# Patient Record
Sex: MALE | Race: WHITE | Employment: FULL TIME | URBAN - METROPOLITAN AREA
[De-identification: names, ages, dates, MRNs, and addresses within clinical notes are randomized per-mention and may not be internally consistent; named-entity substitution may affect disease eponyms.]

---

## 2017-06-06 ENCOUNTER — ALLSCRIPTS OFFICE VISIT (OUTPATIENT)
Dept: OTHER | Facility: OTHER | Age: 47
End: 2017-06-06

## 2017-12-12 ENCOUNTER — ALLSCRIPTS OFFICE VISIT (OUTPATIENT)
Dept: OTHER | Facility: OTHER | Age: 47
End: 2017-12-12

## 2017-12-12 DIAGNOSIS — E55.9 VITAMIN D DEFICIENCY: ICD-10-CM

## 2017-12-12 DIAGNOSIS — E78.5 HYPERLIPIDEMIA: ICD-10-CM

## 2017-12-12 DIAGNOSIS — I10 ESSENTIAL (PRIMARY) HYPERTENSION: ICD-10-CM

## 2017-12-13 NOTE — PROGRESS NOTES
Assessment    1  Hypertension (401 9) (I10)   2  Hyperlipidemia (272 4) (E78 5)   3  Current smoker (305 1) (F17 200)   · currently 2 packs per day  started in the 1990s, usually about 1ppd    Plan  Hyperlipidemia    · Atorvastatin Calcium 20 MG Oral Tablet; take one tablet by mouth every day  Hyperlipidemia, Hypertension    · (1) COMPREHENSIVE METABOLIC PANEL; Status:Active; Requested for:17Jpi5371;    · (1) LIPID PANEL FASTING W DIRECT LDL REFLEX; Status:Active; Requested for:50Etb0813;    · (1) TSH WITH FT4 REFLEX; Status:Active; Requested for:15Jeo7451;   Vitamin D deficiency    · (1) VITAMIN D 25-HYDROXY; Status:Active; Requested for:36Ugr5818;     Discussion/Summary  Discussion Summary:   1  recommend flu vaccinefasting blood workcontinue current blood pressure medication  Counseling Documentation With Imm: The patient was counseled regarding instructions for management,-- patient and family education,-- impressions,-- risks and benefits of treatment options,-- importance of compliance with treatment  Medication SE Review and Pt Understands Tx: Possible side effects of new medications were reviewed with the patient/guardian today  The treatment plan was reviewed with the patient/guardian  The patient/guardian understands and agrees with the treatment plan      Chief Complaint  Chief Complaint Chronic Condition [facility]: Patient is here today for follow up of chronic conditions described in HPI        History of Present Illness  HPI: 57 y/o M here for follow up  above - reviewed meds with patientout of bp medication - took lower dose of same (5/40mg) over last 2 daysnot return for bp re-check after last OVnot check BP at home  smoker, not ready to quithe had to do a urinalysis with his BW Last yearflu vaccine  4:56pm, pt refused to have bp re-checked, stating he has been here too long and has to E  I  vlad Christie that we are looking out for his health and a re-check of his blood pressure would only take 2-3 more mins  stated he will check his BP at home and call with office with readings and proceed to walk out of officeother complaints      Review of Systems  Complete-Male:  Constitutional: no fever  Eyes: no eyesight problems  ENT: no sore throat  Cardiovascular: no chest pain  Respiratory: no shortness of breath  Gastrointestinal: no abdominal pain  Genitourinary: no dysuria  Psychiatric: no depression  ROS Reviewed:   ROS reviewed  Active Problems  1  Abnormal finding on imaging (793 99) (R93 8)   2  Aftercare following surgery of the musculoskeletal system (V58 78) (Z47 89)   3  Anxiety (300 00) (F41 9)   4  Backache (724 5) (M54 9)   5  Changing skin lesion (709 9) (L98 9)   6  Encounter for smoking cessation counseling (V65 42,305 1) (Z71 6,Z72 0)   7  Encounter to discuss test results (V65 49) (Z71 89)   8  Gout (274 9) (M10 9)   9  Hyperlipidemia (272 4) (E78 5)   10  Hypertension (401 9) (I10)   11  Insomnia (780 52) (G47 00)   12  History of Neck pain (723 1) (M54 2)   13  Need for prophylactic vaccination and inoculation against influenza (V04 81) (Z23)   14  Palpitations (785 1) (R00 2)   15  Screening for prostate cancer (V76 44) (Z12 5)   16  Screening for thyroid disorder (V77 0) (Z13 29)   17  Vitamin D deficiency (268 9) (E55 9)   18  Well adult on routine health check (V70 0) (Z00 00)    Past Medical History  1  History of diverticulitis of colon (V12 79) (Z87 19)   2  History of urinary frequency (V13 09) (Z87 898)   3  History of Neck pain (723 1) (M54 2)   4  Palpitations (785 1) (R00 2)  Active Problems And Past Medical History Reviewed: The active problems and past medical history were reviewed and updated today  Surgical History  1  History of Partial Colectomy    Family History  Mother    1  Family history of Chronic Obstructive Pulmonary Disease   2  Family history of Hyperlipidemia   3  Family history of Hypertension (V17 49)  Father    4   Family history of Chronic Obstructive Pulmonary Disease  Maternal Grandfather    5  Family history of Heart Disease (V17 49)  Family History    6  Denied: Family history of Cancer   7  Denied: Family history of Diabetes Mellitus   8  Denied: Family history of Stroke Syndrome    Social History     · Being A Social Drinker   · Current smoker (305 1) (F17 200)   · Daily Coffee Consumption (6  Cups/Day)   · Work History  Social History Reviewed: The social history was reviewed and updated today  Current Meds   1  Amlodipine Besy-Benazepril HCl - 10-20 MG Oral Capsule; TAKE ONE CAPSULE BY MOUTH EVERY DAY; Therapy: 87NRB5116 to ([place])  Requested for: 55YLB9497; Last Rx:12Xwf1611 Ordered   2  Atorvastatin Calcium 20 MG Oral Tablet; take one tablet by mouth every day; Therapy: 60CRA7098 to (Evaluate:72Imk6412)  Requested for: 24FFK9708; Last Rx:00Hzh6049 Ordered  Medication List Reviewed: The medication list was reviewed and updated today  Allergies  1  No Known Drug Allergies    Vitals  Vital Signs    Recorded: 97Dhk9376 04:50PM Recorded: 86Lpj0491 04:26PM   Temperature  97 3 F   Heart Rate  100   Respiration  18   Systolic 958 008   Diastolic 88 90   Height  6 ft 1 in   Weight  256 lb    BMI Calculated  33 78   BSA Calculated  2 39   O2 Saturation  97       Physical Exam   Constitutional  General appearance: No acute distress, well appearing and well nourished  Eyes  Pupils and irises: Equal, round, reactive to light  Ears, Nose, Mouth, and Throat  Oropharynx: Normal with no erythema, edema, exudate or lesions  Pulmonary  Respiratory effort: No increased work of breathing or signs of respiratory distress  Auscultation of lungs: Clear to auscultation  Cardiovascular  Auscultation of heart: Normal rate and rhythm, normal S1 and S2, no murmurs  Examination of extremities for edema and/or varicosities: Normal    Abdomen  Abdomen: Non-tender, no masses     Psychiatric  Judgment and insight: Normal    Mood and affect: Normal   Mood and Affect: anxious-- and-- irritable        Results/Data  PHQ-2 Adult Depression Screening 21Dtw3579 04:29PM      Test Name Result Flag Reference   PHQ-2 Adult Depression Score 0     PHQ-2 Adult Depression Screening Negative           Provider Comments  Provider Comments:   deandre Quijano about SE of uncontrolled HTN including heart/kidney disease  not ready to quit smoking      Signatures   Electronically signed by : Mary Lou Carrasco DO; Dec 12 2017  5:08PM EST                       (Author)

## 2018-01-11 NOTE — RESULT NOTES
Message   blood test results are back   kidney/liver function, urine test & blood sugar look fine  prostate blood test (PSA) is normal at 2 (normal range 0 to 4)  cholesterol is controlled at 172 but bit higher than last year's results  vitamin D is improved but mildly low at 28 (normal is )  recommend to increase vitamin D3 over the counter to 2,000 IU daily for next 3-4 months  Otherwise continue with current treatment plan  Verified Results  (1) VITAMIN D 25-HYDROXY [id number] 08:36AM Janiya Rachel     Test Name Result Flag Reference   Vitamin D, 25-Hydroxy 28 7 ng/mL L 30 0-100 0   Vitamin D deficiency has been defined by the [address] practice guideline as a  level of serum 25-OH vitamin D less than 20 ng/mL (1,2)  The Endocrine Society went on to further define vitamin D  insufficiency as a level between 21 and 29 ng/mL (2)  1  IOM ([place] [employer])  2010  Dietary reference     intakes for calcium and D  [facility]: The     [employer]  2  Zachary MF, Ki SHAW, Sada CRAWFORD, et al      Evaluation, treatment, and prevention of vitamin D     deficiency: an Endocrine Society clinical practice     guideline  JCEM  2011 Jul; 96(7):1911-30  [facility] ZJE15 Default [id number] 08:36AM Janiya Rachel     Test Name Result Flag Reference   Cy Pyo CMP14 Default Comment     A hand-written panel/profile was received from your office  In  accordance with the [facility] Ambiguous Test Code Policy dated July 4169, we have completed your order by using the closest currently  or formerly recognized AMA panel  We have assigned Comprehensive  Metabolic Panel (14), Test Code #094081 to this request   If this  is not the testing you wished to receive on this specimen, please  contact the [address] [place] [address] Client Inquiry/Technical Services Department  to clarify the test order  We appreciate your business       (HUNTER) Orville Osman LP Default [id number] 08:36AM Jessica Lowe     Test Name Result Flag Reference   Orville Osman LP Default Comment     A hand-written panel/profile was received from your office  In  accordance with the [facility] Ambiguous Test Code Policy dated July 6991, we have completed your order by using the closest currently  or formerly recognized AMA panel  We have assigned Lipid Panel,  Test Code #064347 to this request  If this is not the testing you  wished to receive on this specimen, please contact the [address] [place] [address]  Client Inquiry/Technical Services Department to clarify the test  order  We appreciate your business       (1) COMPREHENSIVE METABOLIC PANEL [id number] 78:34CP Jessica Lowe     Test Name Result Flag Reference   Glucose, Serum 91 mg/dL  65-99   BUN 12 mg/dL  6-24   Creatinine, Serum 0 82 mg/dL  0 76-1 27   eGFR If NonAfricn Am 107 mL/min/1 73  >59   eGFR If Africn Am 123 mL/min/1 73  >59   BUN/Creatinine Ratio 15  9-20   Sodium, Serum 138 mmol/L  136-144   **Effective December 12, 2016 the reference interval**                   for Sodium, Serum will be changing to:                                                             134 - 144   Potassium, Serum 4 5 mmol/L  3 5-5 2   Chloride, Serum 97 mmol/L     **Effective December 12, 2016 the reference interval**                   for Chloride, Serum will be changing to:                                                              96 - 106   Carbon Dioxide, Total 24 mmol/L  18-29   Calcium, Serum 9 3 mg/dL  8 7-10 2   Protein, Total, Serum 6 5 g/dL  6 0-8 5   Albumin, Serum 4 6 g/dL  3 5-5 5   Globulin, Total 1 9 g/dL  1 5-4 5   A/G Ratio 2 4  1 1-2 5   Bilirubin, Total 0 4 mg/dL  0 0-1 2   Alkaline Phosphatase, S 79 IU/L     AST (SGOT) 23 IU/L  0-40   ALT (SGPT) 38 IU/L  0-44     (LC) UA/M w/rflx Culture, Routine [id number] 08:36AM Jessica Lowe     Test Name Result Flag Reference   Specific Gravity 1 007  1 005-1 030   pH 7 5  5 0-7 5   Urine-Color Yellow  Yellow Appearance Clear  Clear   WBC Esterase Negative  Negative   Protein Negative  Negative/Trace   Glucose Negative  Negative   Ketones Negative  Negative   Occult Blood Negative  Negative   Bilirubin Negative  Negative   Urobilinogen,Semi-Qn 0 2 EU/dL  0 2-1 0   Nitrite, Urine Negative  Negative   Microscopic Examination Comment     Microscopic follows if indicated  Microscopic Examination See below:     Urinalysis Reflex Comment     This specimen will not reflex to a Urine Culture  WBC 0-5 /hpf  0 -  5   RBC None seen /hpf  0 -  2   Epithelial Cells (non renal) None seen /hpf  0 - 10   Bacteria None seen  None seen/Few     (LC) Lipid Panel [id number] 08:36AM Alvia [place]     Test Name Result Flag Reference   Cholesterol, Total 172 mg/dL  100-199   Triglycerides 96 mg/dL  0-149   HDL Cholesterol 45 mg/dL  >39   VLDL Cholesterol Mario 19 mg/dL  5-40   LDL Cholesterol Calc 108 mg/dL H 0-99     (1) PSA (SCREEN) (Dx V76 44 Screen for Prostate Cancer) [id number] 08:36AM Alvia [place]     Test Name Result Flag Reference   Prostate Specific Ag, Serum 2 0 ng/mL  0 0-4 0   Roche ECLIA methodology  According to the American Urological Association, Serum PSA should  decrease and remain at undetectable levels after radical  prostatectomy  The AUA defines biochemical recurrence as an initial  PSA value 0 2 ng/mL or greater followed by a subsequent confirmatory  PSA value 0 2 ng/mL or greater  Values obtained with different assay methods or kits cannot be used  interchangeably  Results cannot be interpreted as absolute evidence  of the presence or absence of malignant disease

## 2018-01-13 VITALS
SYSTOLIC BLOOD PRESSURE: 154 MMHG | BODY MASS INDEX: 32.64 KG/M2 | RESPIRATION RATE: 18 BRPM | OXYGEN SATURATION: 98 % | HEART RATE: 102 BPM | TEMPERATURE: 99 F | WEIGHT: 246.25 LBS | HEIGHT: 73 IN | DIASTOLIC BLOOD PRESSURE: 92 MMHG

## 2018-01-13 NOTE — PROGRESS NOTES
Assessment    1  Aftercare following surgery of the musculoskeletal system (V58 78) (Z47 89)    Discussion/Summary    He is released from formal care  We will see him when necessary    He may return to work  Chief Complaint    1  Foot Problem  followup for left foot fractures      History of Present Illness  HPI: this gentleman is status post traumatic injury to his left foot, involving the first ray of the great toe  He had open fractures, operative intervention to provide closure, to the, skin, and to promote healing  He presents, the office today  Ambulatory without an assistive   he is able to stand, and weight-bear without pain  He has had reconstitution of some subtle motion, of the great toe  At the MTP joint      Active Problems    1  Abnormal finding on imaging (793 99) (R93 8)   2  Aftercare following surgery of the musculoskeletal system (V58 78) (Z47 89)   3  Anxiety (300 00) (F41 9)   4  Backache (724 5) (M54 9)   5  Encounter for smoking cessation counseling (V65 42,305 1) (Z71 6,Z72 0)   6  Encounter to discuss test results (V65 49) (Z71 89)   7  Gout (274 9) (M10 9)   8  Hyperlipidemia (272 4) (E78 5)   9  Hypertension (401 9) (I10)   10  Insomnia (780 52) (G47 00)   11  History of Neck pain (723 1) (M54 2)   12  Need for prophylactic vaccination and inoculation against influenza (V04 81) (Z23)   13  Palpitations (785 1) (R00 2)   14  Screening for prostate cancer (V76 44) (Z12 5)   15  Screening for thyroid disorder (V77 0) (Z13 29)   16  Vitamin D deficiency (268 9) (E55 9)   17   Well adult on routine health check (V70 0) (Z00 00)    Past Medical History    · History of diverticulitis of colon (V12 79) (Z87 19)   · History of urinary frequency (V13 09) (J76 528)   · History of Neck pain (723 1) (M54 2)   · Palpitations (785 1) (R00 2)    Surgical History    · History of Partial Colectomy    Family History    · Family history of Chronic Obstructive Pulmonary Disease   · Family history of Hyperlipidemia   · Family history of Hypertension (V17 49)    · Family history of Chronic Obstructive Pulmonary Disease    · Family history of Heart Disease (V17 49)    · Denied: Family history of Cancer   · Denied: Family history of Diabetes Mellitus   · Denied: Family history of Stroke Syndrome    Social History    · Being A Social Drinker   · Current smoker (305 1) (F17 200)   · currently 2 packs per day  started in the 1990s, usually about 1ppd   · Daily Coffee Consumption (6  Cups/Day)   · Work History   · , construction    Current Meds   1  Amlodipine Besy-Benazepril HCl - 5-20 MG Oral Capsule; TAKE 1 CAPSULE EVERY   DAY  Requested for: 42IIZ7766; Last Rx:03Nov2015 Ordered   2  Atorvastatin Calcium 20 MG Oral Tablet; TAKE 1 TABLET DAILY AT BEDTIME; Therapy: 91KBM6731 to (Shayan Krueger)  Requested for: 25KOD7835; Last   Rx:03Nov2015 Ordered   3  BuPROPion HCl ER (Smoking Det) 150 MG Oral Tablet Extended Release 12 Hour;   TAKE 1 TABLET ONCE A DAY FOR 3 DAYS THEN TAKE 1 TABLET TWICE A   DAY THEREAFTER; Therapy: 57VHV9070 to (Last Rx:18Hil0148)  Requested for: 82VCS8696 Ordered   4  OxyCODONE HCl - 5 MG Oral Tablet; 1-2 tabs by mouth every 4 hours when necessary   pain; Therapy: 15NYU0896 to (Evaluate:17Ovw2977); Last Rx:27Nov2015 Ordered   5  Vitamin D 1000 UNIT CAPS; take 1 capsule daily; Therapy: 42YSK3385 to (Evaluate:15Apr2015)  Requested for: 80SND3223; Last   Rx:61Xad5663 Ordered    Allergies    1  No Known Drug Allergies    Vitals   Recorded: 90LBV4561 01:32PM   Heart Rate 99   Systolic 431   Diastolic 96   Height 6 ft 1 in   Weight 239 lb 6 08 oz   BMI Calculated 31 58   BSA Calculated 2 32     Physical Exam   his left foot, and great toe, have resumed nearer normal   Size  He has significant healing  Color of his left foot, predominantly the medial left foot, at the MTP joints, is now, resolved    The first 2 nails great toe, and adjacent nail or darkened, and great toe, is being pushed or replaced by, a newgrowing toenail        Attending Note  Collaborating Physician Note: Collaborating Note: I interviewed and examined the patient, I supervised the Advanced Practitioner and I agree with the Advanced Practitioner note  I discussed the case with the Advanced Practitioner and reviewed the AP note      Message  Return to work or school:    He is able to return to work on  01/18/2016      No restrictions          Signatures   Electronically signed by : Alex Ramirez, [facility]; [place] 15 2016  1:59PM EST                       (Author)    Electronically signed by : RK Mccray ; [place] 15 2016  2:02PM EST                       (Author)

## 2018-01-16 NOTE — MISCELLANEOUS
Message  Return to work or school:    He is able to return to work on  01/18/2016      No restrictions          Signatures   Electronically signed by : Gigi Osorio [facility]; [place] 15 2016  1:59PM EST                       (Author)    Electronically signed by : RK Feldman ; [place] 15 2016  2:02PM EST                       (Author)

## 2018-01-23 VITALS
WEIGHT: 256 LBS | RESPIRATION RATE: 18 BRPM | OXYGEN SATURATION: 97 % | TEMPERATURE: 97.3 F | HEIGHT: 73 IN | BODY MASS INDEX: 33.93 KG/M2 | SYSTOLIC BLOOD PRESSURE: 140 MMHG | DIASTOLIC BLOOD PRESSURE: 88 MMHG | HEART RATE: 100 BPM

## 2018-02-23 ENCOUNTER — TELEPHONE (OUTPATIENT)
Dept: INTERNAL MEDICINE CLINIC | Facility: CLINIC | Age: 48
End: 2018-02-23

## 2018-02-23 DIAGNOSIS — E78.2 MIXED HYPERLIPIDEMIA: Primary | ICD-10-CM

## 2018-02-23 RX ORDER — ATORVASTATIN CALCIUM 20 MG/1
1 TABLET, FILM COATED ORAL DAILY
COMMUNITY
Start: 2014-10-17 | End: 2018-02-23 | Stop reason: SDUPTHER

## 2018-02-23 RX ORDER — AMLODIPINE BESYLATE AND BENAZEPRIL HYDROCHLORIDE 10; 20 MG/1; MG/1
1 CAPSULE ORAL DAILY
COMMUNITY
Start: 2017-06-06 | End: 2018-03-22 | Stop reason: SDUPTHER

## 2018-02-23 RX ORDER — ATORVASTATIN CALCIUM 20 MG/1
20 TABLET, FILM COATED ORAL DAILY
Qty: 30 TABLET | Refills: 5 | Status: SHIPPED | OUTPATIENT
Start: 2018-02-23 | End: 2018-06-28 | Stop reason: SDUPTHER

## 2018-02-23 NOTE — TELEPHONE ENCOUNTER
Pharmacy requesting refill on atorvastatin 20 mg tabs, qty 30, take 1 daily   To [facility] pharmacy on file

## 2018-03-22 DIAGNOSIS — I10 ESSENTIAL HYPERTENSION: Primary | ICD-10-CM

## 2018-03-22 RX ORDER — AMLODIPINE BESYLATE AND BENAZEPRIL HYDROCHLORIDE 10; 20 MG/1; MG/1
1 CAPSULE ORAL DAILY
Qty: 90 CAPSULE | Refills: 0 | Status: SHIPPED | OUTPATIENT
Start: 2018-03-22 | End: 2018-06-28 | Stop reason: SDUPTHER

## 2018-03-22 NOTE — TELEPHONE ENCOUNTER
Phone number on file is currently "not accepting calls at this time" no other # to try  Mail letter home?

## 2018-03-22 NOTE — TELEPHONE ENCOUNTER
Did pt complete BW after last OV with me?     If not, it has been >2 yrs since last BW & pt needs to complete as soon as possible    thx

## 2018-05-16 LAB
25(OH)D3+25(OH)D2 SERPL-MCNC: 32.1 NG/ML (ref 30–100)
ALBUMIN SERPL-MCNC: 4.9 G/DL (ref 3.5–5.5)
ALBUMIN/GLOB SERPL: 2.1 {RATIO} (ref 1.2–2.2)
ALP SERPL-CCNC: 88 IU/L (ref 39–117)
ALT SERPL-CCNC: 45 IU/L (ref 0–44)
AMBIG ABBREV DEFAULT: NORMAL
AST SERPL-CCNC: 33 IU/L (ref 0–40)
BILIRUB SERPL-MCNC: 0.4 MG/DL (ref 0–1.2)
BUN SERPL-MCNC: 11 MG/DL (ref 6–24)
BUN/CREAT SERPL: 14 (ref 9–20)
CALCIUM SERPL-MCNC: 9.6 MG/DL (ref 8.7–10.2)
CHLORIDE SERPL-SCNC: 96 MMOL/L (ref 96–106)
CHOLEST SERPL-MCNC: 157 MG/DL (ref 100–199)
CO2 SERPL-SCNC: 27 MMOL/L (ref 18–29)
CREAT SERPL-MCNC: 0.76 MG/DL (ref 0.76–1.27)
GLOBULIN SER-MCNC: 2.3 G/DL (ref 1.5–4.5)
GLUCOSE SERPL-MCNC: 91 MG/DL (ref 65–99)
HDLC SERPL-MCNC: 53 MG/DL
LDLC SERPL CALC-MCNC: 88 MG/DL (ref 0–99)
POTASSIUM SERPL-SCNC: 4.5 MMOL/L (ref 3.5–5.2)
PROT SERPL-MCNC: 7.2 G/DL (ref 6–8.5)
SL AMB EGFR AFRICAN AMERICAN: 126 ML/MIN/1.73
SL AMB EGFR NON AFRICAN AMERICAN: 109 ML/MIN/1.73
SODIUM SERPL-SCNC: 140 MMOL/L (ref 134–144)
TRIGL SERPL-MCNC: 78 MG/DL (ref 0–149)
TSH SERPL DL<=0.005 MIU/L-ACNC: 1.94 UIU/ML (ref 0.45–4.5)

## 2018-05-17 ENCOUNTER — TELEPHONE (OUTPATIENT)
Dept: INTERNAL MEDICINE CLINIC | Facility: CLINIC | Age: 48
End: 2018-05-17

## 2018-05-17 DIAGNOSIS — R74.01 ELEVATED ALT MEASUREMENT: Primary | ICD-10-CM

## 2018-05-17 NOTE — TELEPHONE ENCOUNTER
Tried calling pt and got message that the person you are trying to reach is not accepting calls at this time  Cb later   Will cb

## 2018-05-17 NOTE — TELEPHONE ENCOUNTER
BW is back & looks fine except for mild elevation in liver enzyme, need to re-check liver BW in 1 month and Manuel Garcia needs an appt in June    pls schedule, thx

## 2018-05-18 PROBLEM — R74.01 ELEVATED ALT MEASUREMENT: Status: ACTIVE | Noted: 2018-05-18

## 2018-05-18 NOTE — TELEPHONE ENCOUNTER
2nd attempt     Tried calling pt and got message that pt is not accepting calls     Do you want us to mail a letter home?

## 2018-06-28 DIAGNOSIS — E78.2 MIXED HYPERLIPIDEMIA: ICD-10-CM

## 2018-06-28 DIAGNOSIS — I10 ESSENTIAL HYPERTENSION: ICD-10-CM

## 2018-06-28 RX ORDER — AMLODIPINE BESYLATE AND BENAZEPRIL HYDROCHLORIDE 10; 20 MG/1; MG/1
CAPSULE ORAL
Qty: 90 CAPSULE | Refills: 0 | Status: SHIPPED | OUTPATIENT
Start: 2018-06-28 | End: 2018-09-25 | Stop reason: SDUPTHER

## 2018-06-28 RX ORDER — ATORVASTATIN CALCIUM 20 MG/1
20 TABLET, FILM COATED ORAL DAILY
Qty: 90 TABLET | Refills: 0 | Status: SHIPPED | OUTPATIENT
Start: 2018-06-28 | End: 2019-03-07 | Stop reason: SDUPTHER

## 2018-06-28 RX ORDER — AMLODIPINE BESYLATE AND BENAZEPRIL HYDROCHLORIDE 10; 20 MG/1; MG/1
1 CAPSULE ORAL DAILY
Qty: 90 CAPSULE | Refills: 0 | Status: CANCELLED | OUTPATIENT
Start: 2018-06-28

## 2018-07-25 ENCOUNTER — OFFICE VISIT (OUTPATIENT)
Dept: INTERNAL MEDICINE CLINIC | Facility: CLINIC | Age: 48
End: 2018-07-25
Payer: COMMERCIAL

## 2018-07-25 VITALS
SYSTOLIC BLOOD PRESSURE: 146 MMHG | HEIGHT: 74 IN | RESPIRATION RATE: 18 BRPM | TEMPERATURE: 99.2 F | OXYGEN SATURATION: 98 % | BODY MASS INDEX: 33.34 KG/M2 | DIASTOLIC BLOOD PRESSURE: 88 MMHG | HEART RATE: 96 BPM | WEIGHT: 259.8 LBS

## 2018-07-25 DIAGNOSIS — R74.01 ELEVATED ALT MEASUREMENT: ICD-10-CM

## 2018-07-25 DIAGNOSIS — E78.2 MIXED HYPERLIPIDEMIA: ICD-10-CM

## 2018-07-25 DIAGNOSIS — I10 ESSENTIAL HYPERTENSION: Primary | ICD-10-CM

## 2018-07-25 DIAGNOSIS — Z72.0 TOBACCO USE: ICD-10-CM

## 2018-07-25 PROCEDURE — 99213 OFFICE O/P EST LOW 20 MIN: CPT | Performed by: INTERNAL MEDICINE

## 2018-07-25 PROCEDURE — 3008F BODY MASS INDEX DOCD: CPT | Performed by: INTERNAL MEDICINE

## 2018-07-25 NOTE — PROGRESS NOTES
Assessment/Plan:     Diagnoses and all orders for this visit:    Essential hypertension  Comments:  with hx of white coat syndrome, BP on re-check by me still in 796B systolic   c/w lotrel and monitoring BP at home, if elevated advised pt to call office    Mixed hyperlipidemia  Comments:  Burke risk score of ~13 9%  Refuses to increase atorvastatin dose    Elevated ALT measurement  Comments:  pt interruptive but discussed lab results and to re-check BW for liver enzymes  Orders:  -     Hepatic function panel; Future    Tobacco use  Comments:  still smoking, not ready to attempt quitting      pt reports he does not want to complete f/u liver BW "I get one free lab test per year"  I advised him to complete f/u BW as mild elevation in liver enzyme could be from variety of causes but Swati Mendez interrupted me before further explanation could be provided(e g  alcohol, statins)    Subjective:      Patient ID: Ramy Potts is a 53 y o  male  HPI    Here for follow up  Reviewed meds & most recent BW results with patient  Doug العلي is still smoking and states he is ready to quit but can't because of stress  Advised we called him with his BW results 2+ mos ago and got VM & verified his phone # which is incorrect, "but your stupid fish(Ipad) has it right"  We mailed him a letter in May to follow up but he did not call office back  Declines to increase dose of atorvastatin as his friend was 'put on the same thing' and 'doesn't feel the same'  Understands risks of cont'd smoking including dying of tob related disease  Hx of white coat syndrome and medical non-compliance, needs to be called to complete BW and schedule f/u appts  Pt irritable and short during his appt  He reports his BP is 'better' at home with automated arm cuff - in 130/70s and not 140/90 or higher  No other complaints      Past Medical History:   Diagnosis Date    Diverticulitis of colon      Vitals:    07/25/18 1604   BP: 146/88   Pulse: 96 Resp: 18   Temp: 99 2 °F (37 3 °C)   TempSrc: Oral   SpO2: 98%   Weight: 118 kg (259 lb 12 8 oz)   Height: 6' 2" (1 88 m)     Body mass index is 33 36 kg/m²  Current Outpatient Prescriptions:     amLODIPine-benazepril (LOTREL) 10-20 MG per capsule, TAKE ONE CAPSULE BY MOUTH EVERY DAY, Disp: 90 capsule, Rfl: 0    atorvastatin (LIPITOR) 20 mg tablet, Take 1 tablet (20 mg total) by mouth daily, Disp: 90 tablet, Rfl: 0  No Known Allergies      Review of Systems   Constitutional: Negative for fever  HENT: Negative for congestion  Eyes: Negative for visual disturbance  Respiratory: Negative for shortness of breath  Cardiovascular: Negative for chest pain  Gastrointestinal: Negative for abdominal pain  Genitourinary: Negative for difficulty urinating  Musculoskeletal: Negative for arthralgias  Neurological: Negative for dizziness  Psychiatric/Behavioral: Positive for agitation  Objective:      /88   Pulse 96   Temp 99 2 °F (37 3 °C) (Oral)   Resp 18   Ht 6' 2" (1 88 m)   Wt 118 kg (259 lb 12 8 oz)   SpO2 98%   BMI 33 36 kg/m²          Physical Exam   Constitutional: He appears well-developed and well-nourished  HENT:   Head: Normocephalic and atraumatic  Mouth/Throat: Oropharynx is clear and moist    Eyes: Conjunctivae are normal  Pupils are equal, round, and reactive to light  Cardiovascular: Normal rate, regular rhythm and normal heart sounds  No murmur heard  Pulmonary/Chest: Effort normal and breath sounds normal  He has no wheezes  He has no rales  Abdominal: Bowel sounds are normal    Musculoskeletal: He exhibits no edema  Neurological: He is alert  Psychiatric: He has a normal mood and affect  His behavior is normal    Irritable/interruptive   Vitals reviewed        Results for orders placed or performed in visit on 05/15/18   Comprehensive metabolic panel   Result Value Ref Range    SL AMB GLUCOSE 91 65 - 99 mg/dL    BUN 11 6 - 24 mg/dL    Creatinine, Serum 0 76 0 76 - 1 27 mg/dL    eGFR Non African American 109 >59 mL/min/1 73    SL AMB EGFR AFRICAN AMERICAN 126 >59 mL/min/1 73    SL AMB BUN/CREATININE RATIO 14 9 - 20    SL AMB SODIUM 140 134 - 144 mmol/L    SL AMB POTASSIUM 4 5 3 5 - 5 2 mmol/L    SL AMB CHLORIDE 96 96 - 106 mmol/L    SL AMB CARBON DIOXIDE 27 18 - 29 mmol/L    CALCIUM 9 6 8 7 - 10 2 mg/dL    SL AMB PROTEIN, TOTAL 7 2 6 0 - 8 5 g/dL    Serum Albumin 4 9 3 5 - 5 5 g/dL    Globulin, Total 2 3 1 5 - 4 5 g/dL    SL AMB ALBUMIN/GLOBULIN RATIO 2 1 1 2 - 2 2    SL AMB BILIRUBIN, TOTAL 0 4 0 0 - 1 2 mg/dL    Alk Phos Isoenzymes 88 39 - 117 IU/L    SL AMB AST 33 0 - 40 IU/L    SL AMB ALT 45 (H) 0 - 44 IU/L   Lipid panel   Result Value Ref Range    Cholesterol, Total 157 100 - 199 mg/dL    Triglycerides 78 0 - 149 mg/dL    HDL 53 >39 mg/dL    LDL Direct 88 0 - 99 mg/dL   Vitamin D 25 hydroxy   Result Value Ref Range    25-HYDROXY VIT D 32 1 30 0 - 100 0 ng/mL   TSH WITH REFLEX TO FREE T4   Result Value Ref Range    TSH 1 940 0 450 - 4 500 uIU/mL   Ambig Abbrev Default   Result Value Ref Range    AMBIG ABBREV DEFAULT Comment

## 2018-08-25 DIAGNOSIS — E78.2 MIXED HYPERLIPIDEMIA: ICD-10-CM

## 2018-08-26 RX ORDER — ATORVASTATIN CALCIUM 20 MG/1
TABLET, FILM COATED ORAL
Qty: 30 TABLET | Refills: 5 | Status: SHIPPED | OUTPATIENT
Start: 2018-08-26 | End: 2019-03-07

## 2018-09-25 DIAGNOSIS — I10 ESSENTIAL HYPERTENSION: ICD-10-CM

## 2018-09-25 RX ORDER — AMLODIPINE BESYLATE AND BENAZEPRIL HYDROCHLORIDE 10; 20 MG/1; MG/1
CAPSULE ORAL
Qty: 90 CAPSULE | Refills: 0 | Status: SHIPPED | OUTPATIENT
Start: 2018-09-25 | End: 2018-12-26 | Stop reason: SDUPTHER

## 2018-12-26 DIAGNOSIS — I10 ESSENTIAL HYPERTENSION: ICD-10-CM

## 2018-12-26 RX ORDER — AMLODIPINE BESYLATE AND BENAZEPRIL HYDROCHLORIDE 10; 20 MG/1; MG/1
1 CAPSULE ORAL DAILY
Qty: 90 CAPSULE | Refills: 0 | Status: SHIPPED | OUTPATIENT
Start: 2018-12-26 | End: 2019-03-07 | Stop reason: SDUPTHER

## 2018-12-26 NOTE — TELEPHONE ENCOUNTER
Are Enrique's home blood pressure readings okay? What are his home readings? Is he going to complete Blood work as recommended for liver?

## 2018-12-27 NOTE — TELEPHONE ENCOUNTER
Spoke w/ pt  He hasn't been taking his bp he forgot  He said he gets his bw done in [place]  He said he sees you in [facility] you usually give him bw to do then so he will do it all at once

## 2019-01-03 ENCOUNTER — HOSPITAL ENCOUNTER (INPATIENT)
Facility: HOSPITAL | Age: 49
LOS: 1 days | Discharge: HOME | DRG: 395 | End: 2019-01-06
Attending: EMERGENCY MEDICINE | Admitting: SURGERY
Payer: COMMERCIAL

## 2019-01-03 ENCOUNTER — APPOINTMENT (EMERGENCY)
Dept: CT IMAGING | Facility: HOSPITAL | Age: 49
DRG: 395 | End: 2019-01-03
Payer: COMMERCIAL

## 2019-01-03 DIAGNOSIS — K37 APPENDICITIS: Primary | ICD-10-CM

## 2019-01-03 DIAGNOSIS — K36 CHRONIC APPENDICITIS: ICD-10-CM

## 2019-01-03 LAB
ALBUMIN SERPL BCP-MCNC: 3.5 G/DL (ref 3.5–5)
ALP SERPL-CCNC: 88 U/L (ref 46–116)
ALT SERPL W P-5'-P-CCNC: 34 U/L (ref 12–78)
ANION GAP SERPL CALCULATED.3IONS-SCNC: 11 MMOL/L (ref 4–13)
AST SERPL W P-5'-P-CCNC: 16 U/L (ref 5–45)
BASOPHILS # BLD AUTO: 0.04 THOUSANDS/ΜL (ref 0–0.1)
BASOPHILS NFR BLD AUTO: 0 % (ref 0–1)
BILIRUB SERPL-MCNC: 0.8 MG/DL (ref 0.2–1)
BUN SERPL-MCNC: 13 MG/DL (ref 5–25)
CALCIUM SERPL-MCNC: 9.3 MG/DL (ref 8.3–10.1)
CHLORIDE SERPL-SCNC: 95 MMOL/L (ref 100–108)
CO2 SERPL-SCNC: 26 MMOL/L (ref 21–32)
CREAT SERPL-MCNC: 1.14 MG/DL (ref 0.6–1.3)
EOSINOPHIL # BLD AUTO: 0.05 THOUSAND/ΜL (ref 0–0.61)
EOSINOPHIL NFR BLD AUTO: 0 % (ref 0–6)
ERYTHROCYTE [DISTWIDTH] IN BLOOD BY AUTOMATED COUNT: 13.4 % (ref 11.6–15.1)
GFR SERPL CREATININE-BSD FRML MDRD: 76 ML/MIN/1.73SQ M
GLUCOSE SERPL-MCNC: 104 MG/DL (ref 65–140)
HCT VFR BLD AUTO: 45.3 % (ref 36.5–49.3)
HGB BLD-MCNC: 15.5 G/DL (ref 12–17)
IMM GRANULOCYTES # BLD AUTO: 0.07 THOUSAND/UL (ref 0–0.2)
IMM GRANULOCYTES NFR BLD AUTO: 0 % (ref 0–2)
LACTATE SERPL-SCNC: 1.1 MMOL/L (ref 0.5–2)
LYMPHOCYTES # BLD AUTO: 1.53 THOUSANDS/ΜL (ref 0.6–4.47)
LYMPHOCYTES NFR BLD AUTO: 9 % (ref 14–44)
MCH RBC QN AUTO: 30.2 PG (ref 26.8–34.3)
MCHC RBC AUTO-ENTMCNC: 34.2 G/DL (ref 31.4–37.4)
MCV RBC AUTO: 88 FL (ref 82–98)
MONOCYTES # BLD AUTO: 1.28 THOUSAND/ΜL (ref 0.17–1.22)
MONOCYTES NFR BLD AUTO: 7 % (ref 4–12)
NEUTROPHILS # BLD AUTO: 14.69 THOUSANDS/ΜL (ref 1.85–7.62)
NEUTS SEG NFR BLD AUTO: 84 % (ref 43–75)
NRBC BLD AUTO-RTO: 0 /100 WBCS
PLATELET # BLD AUTO: 308 THOUSANDS/UL (ref 149–390)
PMV BLD AUTO: 9.6 FL (ref 8.9–12.7)
POTASSIUM SERPL-SCNC: 3.6 MMOL/L (ref 3.5–5.3)
PROT SERPL-MCNC: 7.4 G/DL (ref 6.4–8.2)
RBC # BLD AUTO: 5.13 MILLION/UL (ref 3.88–5.62)
SODIUM SERPL-SCNC: 132 MMOL/L (ref 136–145)
WBC # BLD AUTO: 17.66 THOUSAND/UL (ref 4.31–10.16)

## 2019-01-03 PROCEDURE — 96374 THER/PROPH/DIAG INJ IV PUSH: CPT

## 2019-01-03 PROCEDURE — 36415 COLL VENOUS BLD VENIPUNCTURE: CPT | Performed by: EMERGENCY MEDICINE

## 2019-01-03 PROCEDURE — 85025 COMPLETE CBC W/AUTO DIFF WBC: CPT | Performed by: EMERGENCY MEDICINE

## 2019-01-03 PROCEDURE — 74177 CT ABD & PELVIS W/CONTRAST: CPT

## 2019-01-03 PROCEDURE — 99285 EMERGENCY DEPT VISIT HI MDM: CPT

## 2019-01-03 PROCEDURE — 94640 AIRWAY INHALATION TREATMENT: CPT

## 2019-01-03 PROCEDURE — 80053 COMPREHEN METABOLIC PANEL: CPT | Performed by: EMERGENCY MEDICINE

## 2019-01-03 PROCEDURE — 83605 ASSAY OF LACTIC ACID: CPT | Performed by: EMERGENCY MEDICINE

## 2019-01-03 PROCEDURE — 96361 HYDRATE IV INFUSION ADD-ON: CPT

## 2019-01-03 RX ORDER — ATORVASTATIN CALCIUM 20 MG/1
20 TABLET, FILM COATED ORAL
Status: DISCONTINUED | OUTPATIENT
Start: 2019-01-03 | End: 2019-01-06 | Stop reason: HOSPADM

## 2019-01-03 RX ORDER — DEXTROSE, SODIUM CHLORIDE, AND POTASSIUM CHLORIDE 5; .45; .15 G/100ML; G/100ML; G/100ML
75 INJECTION INTRAVENOUS CONTINUOUS
Status: DISCONTINUED | OUTPATIENT
Start: 2019-01-03 | End: 2019-01-05

## 2019-01-03 RX ORDER — ATORVASTATIN CALCIUM 20 MG/1
20 TABLET, FILM COATED ORAL DAILY
Status: DISCONTINUED | OUTPATIENT
Start: 2019-01-04 | End: 2019-01-03 | Stop reason: SDUPTHER

## 2019-01-03 RX ORDER — HYDROMORPHONE HCL/PF 1 MG/ML
0.5 SYRINGE (ML) INJECTION
Status: DISCONTINUED | OUTPATIENT
Start: 2019-01-03 | End: 2019-01-06 | Stop reason: HOSPADM

## 2019-01-03 RX ORDER — OXYCODONE HYDROCHLORIDE AND ACETAMINOPHEN 5; 325 MG/1; MG/1
1 TABLET ORAL EVERY 4 HOURS PRN
Status: DISCONTINUED | OUTPATIENT
Start: 2019-01-03 | End: 2019-01-06 | Stop reason: HOSPADM

## 2019-01-03 RX ORDER — ALBUTEROL SULFATE 2.5 MG/3ML
5 SOLUTION RESPIRATORY (INHALATION) ONCE
Status: COMPLETED | OUTPATIENT
Start: 2019-01-03 | End: 2019-01-03

## 2019-01-03 RX ORDER — ONDANSETRON 2 MG/ML
4 INJECTION INTRAMUSCULAR; INTRAVENOUS EVERY 4 HOURS PRN
Status: DISCONTINUED | OUTPATIENT
Start: 2019-01-03 | End: 2019-01-06 | Stop reason: HOSPADM

## 2019-01-03 RX ORDER — KETOROLAC TROMETHAMINE 30 MG/ML
10 INJECTION, SOLUTION INTRAMUSCULAR; INTRAVENOUS ONCE
Status: COMPLETED | OUTPATIENT
Start: 2019-01-03 | End: 2019-01-03

## 2019-01-03 RX ORDER — CEFAZOLIN SODIUM 2 G/50ML
2000 SOLUTION INTRAVENOUS ONCE
Status: COMPLETED | OUTPATIENT
Start: 2019-01-03 | End: 2019-01-03

## 2019-01-03 RX ORDER — ACETAMINOPHEN 325 MG/1
650 TABLET ORAL EVERY 4 HOURS PRN
Status: DISCONTINUED | OUTPATIENT
Start: 2019-01-03 | End: 2019-01-06 | Stop reason: HOSPADM

## 2019-01-03 RX ADMIN — ALBUTEROL SULFATE 5 MG: 2.5 SOLUTION RESPIRATORY (INHALATION) at 16:04

## 2019-01-03 RX ADMIN — ACETAMINOPHEN 650 MG: 325 TABLET, FILM COATED ORAL at 21:04

## 2019-01-03 RX ADMIN — KETOROLAC TROMETHAMINE: 30 INJECTION, SOLUTION INTRAMUSCULAR at 16:12

## 2019-01-03 RX ADMIN — IOHEXOL 100 ML: 350 INJECTION, SOLUTION INTRAVENOUS at 17:26

## 2019-01-03 RX ADMIN — SODIUM CHLORIDE 1000 ML: 0.9 INJECTION, SOLUTION INTRAVENOUS at 16:12

## 2019-01-03 RX ADMIN — CEFAZOLIN SODIUM 2000 MG: 2 SOLUTION INTRAVENOUS at 19:38

## 2019-01-03 RX ADMIN — DEXTROSE, SODIUM CHLORIDE, AND POTASSIUM CHLORIDE 125 ML/HR: 5; .45; .15 INJECTION INTRAVENOUS at 20:25

## 2019-01-03 RX ADMIN — PIPERACILLIN SODIUM,TAZOBACTAM SODIUM 3.38 G: 3; .375 INJECTION, POWDER, FOR SOLUTION INTRAVENOUS at 21:02

## 2019-01-03 RX ADMIN — METRONIDAZOLE 500 MG: 500 INJECTION, SOLUTION INTRAVENOUS at 20:24

## 2019-01-03 NOTE — ED PROVIDER NOTES
History  Chief Complaint   Patient presents with    Abdominal Pain     pt c/o RLQ pain and loss of appetite that started about 2 days ago, states it comes and goes and only hurts when you push on his belly  States it sometimes radiates into his groin/R flank  States hx of diverticulitus  Denies NVD  73-year-old male presents to the emergency department gesturing to the suprapubic region and right lower quadrant relating I have pain here and here    He relates that this has been ongoing for 2 days  He relates that the area bothers him when he coughs  He notes that he has been coughing for the last 2 weeks without improvement  Today he went to an urgent care center for this reason  He shows me the printout from this visit including lab test results (including leukocytosis [place] [address]), notation that a chest x-ray was performed and that doxycycline was prescribed  This has not yet been started  The patient relates that he was referred in here for evaluation his abdominal pain  Cough has been productive of sputum  He does not believe any blood has been present in this and relates that the majority of the coughing and sputum expectoration have occurred at night  He has not visualized this  He relates that he has had sweats and fevers over the past 2 weeks  Early today his temperature was 100degrees at home  His appetite has been decreased for the past 2 days and he has been eating and drinking less over this time  He has had decreased stool passage though notes he is passing gas well  He has not appreciated any change in urination  He has not had hematuria or dysuria  He expresses concern that he had diverticulitis in the past   He reports having had 1 episode of this and having required surgical treatment at that time due to perforation  Current discomfort does not feel similar  He notes this only with movement and coughing  He is comfortable when still    He does express concern for possible appendicitis  Medical history otherwise significant for hypertension  He was recently around 2 relatives with pneumonia  Prior to Admission Medications   Prescriptions Last Dose Informant Patient Reported? Taking? amLODIPine-benazepril (LOTREL) 10-20 MG per capsule   No No   Sig: Take 1 capsule by mouth daily   atorvastatin (LIPITOR) 20 mg tablet  Self No No   Sig: Take 1 tablet (20 mg total) by mouth daily   atorvastatin (LIPITOR) 20 mg tablet   No No   Sig: TAKE ONE TABLET BY MOUTH EVERY DAY      Facility-Administered Medications: None       Past Medical History:   Diagnosis Date    Diverticulitis of colon     Hypertension        Past Surgical History:   Procedure Laterality Date    COLECTOMY         Family History   Problem Relation Age of Onset   Alhajirenny Angelo COPD Mother     Hyperlipidemia Mother     Hypertension Mother     COPD Father     Coronary artery disease Maternal Grandfather      I have reviewed and agree with the history as documented  Social History   Substance Use Topics    Smoking status: Current Every Day Smoker     Packs/day: 0 25     Types: Cigarettes    Smokeless tobacco: Never Used    Alcohol use Yes      Comment: social        Review of Systems   HENT: Positive for congestion  All other systems reviewed and are negative  Physical Exam  Physical Exam   Constitutional: He is oriented to person, place, and time  He appears well-developed and well-nourished  HENT:   Head: Normocephalic  Right Ear: Tympanic membrane and ear canal normal    Left Ear: Tympanic membrane and ear canal normal    Eyes: Conjunctivae and EOM are normal    Cardiovascular: Normal rate and regular rhythm  Pulmonary/Chest: Effort normal  He has wheezes (Diffuse inspiratory and expiratory)  Abdominal: Soft  Bowel sounds are normal  He exhibits no distension  There is tenderness ( focally in the suprapubic region and just lateral to this in each direction    There is no CVA tenderness  )  There is guarding ( suprapubic)  Musculoskeletal: Normal range of motion  Neurological: He is alert and oriented to person, place, and time  Skin: Skin is warm and dry  Psychiatric: He has a normal mood and affect  His behavior is normal    Nursing note and vitals reviewed        Vital Signs  ED Triage Vitals   Temperature Pulse Respirations Blood Pressure SpO2   01/03/19 1517 01/03/19 1514 01/03/19 1514 01/03/19 1514 01/03/19 1514   99 °F (37 2 °C) 100 18 144/83 96 %      Temp Source Heart Rate Source Patient Position - Orthostatic VS BP Location FiO2 (%)   01/03/19 1517 01/03/19 1514 01/03/19 1514 01/03/19 1514 --   Oral Monitor Sitting Right arm       Pain Score       01/03/19 1514       No Pain           Vitals:    01/03/19 1842 01/03/19 2300 01/04/19 0727 01/04/19 1435   BP: 144/85 107/64 122/65 118/72   Pulse: 98 62 82 91   Patient Position - Orthostatic VS: Sitting Lying  Lying       Visual Acuity      ED Medications  Medications   atorvastatin (LIPITOR) tablet 20 mg (20 mg Oral Given 1/4/19 1607)   amLODIPine-benazepril (LOTREL 10/20) combo dose ( Oral Given 1/4/19 0959)   oxyCODONE-acetaminophen (PERCOCET) 5-325 mg per tablet 1 tablet (not administered)   ondansetron (ZOFRAN) injection 4 mg (not administered)   HYDROmorphone (DILAUDID) injection 0 5 mg (not administered)   dextrose 5 % and sodium chloride 0 45 % with KCl 20 mEq/L infusion (125 mL/hr Intravenous New Bag 1/4/19 1607)   nicotine (NICODERM CQ) 7 mg/24hr TD 24 hr patch 1 patch (1 patch Transdermal Not Given 1/4/19 1000)   enoxaparin (LOVENOX) subcutaneous injection 40 mg (40 mg Subcutaneous Given 1/4/19 1000)   piperacillin-tazobactam (ZOSYN) 3 375 g in sodium chloride 0 9 % 50 mL IVPB (3 375 g Intravenous New Bag 1/4/19 1227)   metroNIDAZOLE (FLAGYL) IVPB (premix) 500 mg (500 mg Intravenous New Bag 1/4/19 1001)   acetaminophen (TYLENOL) tablet 650 mg (650 mg Oral Given 1/4/19 1002)   sodium chloride 0 9 % bolus 1,000 mL (1,000 mL Intravenous New Bag 1/3/19 1612)   ketorolac (TORADOL) injection 9 9 mg ( Intravenous Given 1/3/19 1612)   albuterol inhalation solution 5 mg (5 mg Nebulization Given 1/3/19 1604)   iohexol (OMNIPAQUE) 350 MG/ML injection (SINGLE-DOSE) 100 mL (100 mL Intravenous Given 1/3/19 1726)   ceFAZolin (ANCEF) IVPB (premix) 2,000 mg (2,000 mg Intravenous New Bag 1/3/19 1938)   metroNIDAZOLE (FLAGYL) IVPB (premix) 500 mg (500 mg Intravenous New Bag 1/3/19 2024)       Diagnostic Studies  Results Reviewed     Procedure Component Value Units Date/Time    Comprehensive metabolic panel [[id number]]  (Abnormal) Collected:  01/03/19 1609    Lab Status:  Final result Specimen:  Blood from Arm, Right Updated:  01/03/19 1704     Sodium 132 (L) mmol/L      Potassium 3 6 mmol/L      Chloride 95 (L) mmol/L      CO2 26 mmol/L      ANION GAP 11 mmol/L      BUN 13 mg/dL      Creatinine 1 14 mg/dL      Glucose 104 mg/dL      Calcium 9 3 mg/dL      AST 16 U/L      ALT 34 U/L      Alkaline Phosphatase 88 U/L      Total Protein 7 4 g/dL      Albumin 3 5 g/dL      Total Bilirubin 0 80 mg/dL      eGFR 76 ml/min/1 73sq m     Narrative:         National Kidney Disease Education Program recommendations are as follows:  GFR calculation is accurate only with a steady state creatinine  Chronic Kidney disease less than 60 ml/min/1 73 sq  meters  Kidney failure less than 15 ml/min/1 73 sq  meters  Lactic acid, plasma [[id number]]  (Normal) Collected:  01/03/19 1609    Lab Status:  Final result Specimen:  Blood from Arm, Right Updated:  01/03/19 1653     LACTIC ACID 1 1 mmol/L     Narrative:         Result may be elevated if tourniquet was used during collection      CBC and differential [[id number]]  (Abnormal) Collected:  01/03/19 1609    Lab Status:  Final result Specimen:  Blood from Arm, Right Updated:  01/03/19 1621     WBC 17 66 (H) Thousand/uL      RBC 5 13 Million/uL      Hemoglobin 15 5 g/dL      Hematocrit 45 3 %      MCV 88 fL MCH 30 2 pg      MCHC 34 2 g/dL      RDW 13 4 %      MPV 9 6 fL      Platelets 840 Thousands/uL      nRBC 0 /100 WBCs      Neutrophils Relative 84 (H) %      Immat GRANS % 0 %      Lymphocytes Relative 9 (L) %      Monocytes Relative 7 %      Eosinophils Relative 0 %      Basophils Relative 0 %      Neutrophils Absolute 14 69 (H) Thousands/µL      Immature Grans Absolute 0 07 Thousand/uL      Lymphocytes Absolute 1 53 Thousands/µL      Monocytes Absolute 1 28 (H) Thousand/µL      Eosinophils Absolute 0 05 Thousand/µL      Basophils Absolute 0 04 Thousands/µL                  CT abdomen pelvis with contrast   Final Result by Felisha Wells MD (01/03 1740)      Advanced inflammatory changes throughout the right lower quadrant with prominent fat stranding and streaky nonloculated fluid obscuring the underlying anatomy  The appendix is not discretely identified however I favor acute appendicitis as the etiology    of these inflammatory changes  I personally discussed this study with Carlos Yen on 1/3/2019 at 5:39 PM                Workstation performed: [id number]         XR chest pa & lateral    (Results Pending)              Procedures  Procedures       Phone Contacts  ED Phone Contact    ED Course  ED Course as of Jan 04 1728   Thu Jan 03, 2019   1627 Outpt  Chest Xray reviewed  No infiltrate appreciated  Prominent bronchiolar markings  [address]  Phone call received from radiologist Dr Melany Segal patient updated on study results revealing appendicitis  Dr Saldana informed  He intends to take patient to the OR tonight  Patient would brought into observation status  I will initiate antibiotics  MDM  Number of Diagnoses or Management Options  Appendicitis:   Diagnosis management comments: Patient evaluated by Irena Garnica in the emergency department  Patient informed him that he has had discomfort in the lower abdomen over the past 1 month    Diffuse inflammatory changes along with this history suggest chronic appendicitis  Patient will be admitted and treated with IV antibiotics  He will be closely monitored  No acute surgical intervention tonight; this will likely be performed in delayed fashion after inflammation resolves  CritCare Time    Disposition  Final diagnoses:   Appendicitis     Time reflects when diagnosis was documented in both MDM as applicable and the Disposition within this note     Time User Action Codes Description Comment    1/3/2019  5:49 PM Willia Kub A Add [K35 80] Acute appendicitis     1/4/2019  5:28 PM Willia Kub A Add [place] Appendicitis     1/4/2019  5:28 PM Willia Kub A Remove [K37] Appendicitis     1/4/2019  5:28 PM Willia Kub A Add [place] Appendicitis     1/4/2019  5:28 PM Willia Kub A Modify [K37] Appendicitis     1/4/2019  5:28 PM Willia Kub A Remove [K35 80] Acute appendicitis       ED Disposition     ED Disposition Condition Comment    Admit  Case was discussed with Dr Dave Soares and the patient's admission status was agreed to be Admission Status: observation status to the service of Dr Dave Soares   Follow-up Information    None         Current Discharge Medication List      CONTINUE these medications which have NOT CHANGED    Details   amLODIPine-benazepril (LOTREL) 10-20 MG per capsule Take 1 capsule by mouth daily  Qty: 90 capsule, Refills: 0    Associated Diagnoses: Essential hypertension      !! atorvastatin (LIPITOR) 20 mg tablet Take 1 tablet (20 mg total) by mouth daily  Qty: 90 tablet, Refills: 0    Associated Diagnoses: Mixed hyperlipidemia      !! atorvastatin (LIPITOR) 20 mg tablet TAKE ONE TABLET BY MOUTH EVERY DAY  Qty: 30 tablet, Refills: 5    Associated Diagnoses: Mixed hyperlipidemia       !! - Potential duplicate medications found  Please discuss with provider  No discharge procedures on file      ED Provider  Electronically Signed by           Arabella Murguia MD  01/04/19 5585

## 2019-01-03 NOTE — H&P
I agree with the emergency room physicians history of present illness well exam   Upon further reflection, patient states he has had pain in the right lower quadrant for up to a month  Was more severe 1 month ago, then a dissipated  He states he has chronic discomfort in the lower abdomen ever since his diverticulitis surgery  Even now he states he has no pain at rest, he does have pain with palpation and cough  Prior to Admission Medications   Prescriptions Last Dose Informant Patient Reported? Taking? amLODIPine-benazepril (LOTREL) 10-20 MG per capsule     No No   Sig: Take 1 capsule by mouth daily   atorvastatin (LIPITOR) 20 mg tablet   Self No No   Sig: Take 1 tablet (20 mg total) by mouth daily   atorvastatin (LIPITOR) 20 mg tablet     No No   Sig: TAKE ONE TABLET BY MOUTH EVERY DAY      Facility-Administered Medications: None         Medical History        Past Medical History:   Diagnosis Date    Diverticulitis of colon      Hypertension              Surgical History         Past Surgical History:   Procedure Laterality Date    COLECTOMY                      Family History   Problem Relation Age of Onset    COPD Mother      Hyperlipidemia Mother      Hypertension Mother      COPD Father      Coronary artery disease Maternal Grandfather        I have reviewed and agree with the history as documented              Social History   Substance Use Topics    Smoking status: Current Every Day Smoker       Packs/day: 0 25       Types: Cigarettes    Smokeless tobacco: Never Used    Alcohol use Yes          Comment: social         Review of Systems is otherwise negative except as delineated above  Physical exam-Alert and oriented x3  Vital signs are stable  HEENT within normal limits  Breath sounds are clear, heart tones are regular  Abdomen is soft  Tenderness is located the right lower quadrant the abdomen associated with rebound  Is nontender at rest   No mass effect    No erythema or induration  No herniations  Back no CVA tenderness  Extremities no edema  Assessment and plan-chronic on acute appendicitis  Marked inflammatory changes noted in the right lower quadrant the abdomen  Bowel loops are thickened and matted  Bladder was also thickened secondary to contiguous inflammation  Discussed options with the patient  Laparoscopy for possible appendectomy discussed  Explained risks and benefits  Other option would be observation on IV antibiotics and observe for interval improvement  Patient believes that this is a safer option  I would concur  He still aware that his pain may not be resolved with intravenous antibiotics and the infection could go on to abscess formation  Intravenous antibiotics selected as course of treatment for now  History of smoking    Hypertension, hypercholesterolemia

## 2019-01-04 ENCOUNTER — APPOINTMENT (OUTPATIENT)
Dept: RADIOLOGY | Facility: HOSPITAL | Age: 49
DRG: 395 | End: 2019-01-04
Payer: COMMERCIAL

## 2019-01-04 PROBLEM — K36 CHRONIC APPENDICITIS: Status: ACTIVE | Noted: 2019-01-04

## 2019-01-04 PROCEDURE — 71046 X-RAY EXAM CHEST 2 VIEWS: CPT

## 2019-01-04 RX ADMIN — PIPERACILLIN SODIUM,TAZOBACTAM SODIUM 3.38 G: 3; .375 INJECTION, POWDER, FOR SOLUTION INTRAVENOUS at 18:47

## 2019-01-04 RX ADMIN — PIPERACILLIN SODIUM,TAZOBACTAM SODIUM 3.38 G: 3; .375 INJECTION, POWDER, FOR SOLUTION INTRAVENOUS at 06:32

## 2019-01-04 RX ADMIN — PIPERACILLIN SODIUM,TAZOBACTAM SODIUM 3.38 G: 3; .375 INJECTION, POWDER, FOR SOLUTION INTRAVENOUS at 01:34

## 2019-01-04 RX ADMIN — DEXTROSE, SODIUM CHLORIDE, AND POTASSIUM CHLORIDE 125 ML/HR: 5; .45; .15 INJECTION INTRAVENOUS at 06:34

## 2019-01-04 RX ADMIN — METRONIDAZOLE 500 MG: 500 INJECTION, SOLUTION INTRAVENOUS at 18:00

## 2019-01-04 RX ADMIN — PIPERACILLIN SODIUM,TAZOBACTAM SODIUM 3.38 G: 3; .375 INJECTION, POWDER, FOR SOLUTION INTRAVENOUS at 12:27

## 2019-01-04 RX ADMIN — ACETAMINOPHEN 650 MG: 325 TABLET, FILM COATED ORAL at 10:02

## 2019-01-04 RX ADMIN — METRONIDAZOLE 500 MG: 500 INJECTION, SOLUTION INTRAVENOUS at 10:01

## 2019-01-04 RX ADMIN — ATORVASTATIN CALCIUM 20 MG: 20 TABLET, FILM COATED ORAL at 16:07

## 2019-01-04 RX ADMIN — ENOXAPARIN SODIUM 40 MG: 40 INJECTION SUBCUTANEOUS at 10:00

## 2019-01-04 RX ADMIN — DEXTROSE, SODIUM CHLORIDE, AND POTASSIUM CHLORIDE 125 ML/HR: 5; .45; .15 INJECTION INTRAVENOUS at 16:07

## 2019-01-04 RX ADMIN — METRONIDAZOLE 500 MG: 500 INJECTION, SOLUTION INTRAVENOUS at 03:47

## 2019-01-04 RX ADMIN — BENAZEPRIL HYDROCHLORIDE: 10 TABLET, FILM COATED ORAL at 09:59

## 2019-01-04 NOTE — PLAN OF CARE
Problem: DISCHARGE PLANNING - CARE MANAGEMENT  Goal: Discharge to post-acute care or home with appropriate resources  INTERVENTIONS:  - Conduct assessment to determine patient/family and health care team treatment goals, and need for post-acute services based on payer coverage, community resources, and patient preferences, and barriers to discharge  - Address psychosocial, clinical, and financial barriers to discharge as identified in assessment in conjunction with the patient/family and health care team  - Arrange appropriate level of post-acute services according to patients   needs and preference and payer coverage in collaboration with the physician and health care team  - Communicate with and update the patient/family, physician, and health care team regarding progress on the discharge plan  - Arrange appropriate transportation to post-acute venues  Outcome: Progressing  CM spoke with patient at the bedside re: Outpatient Observation notice  Patient signed form  Copy of form provided to the patient  All questions/concerns answered at the bedside

## 2019-01-04 NOTE — PROGRESS NOTES
Progress Note -Surgery PA  Leonardo Leigh 53 y o  male MRN: [id number]  Unit/Bed#: -01 Encounter: [id number]    ASSESSMENT/PLAN:  Problem List     * (Principal)Chronic appendicitis    Mixed hyperlipidemia    Essential hypertension    Anxiety   54 yo M with chronic appendicitis with acute exacerbation  Pain improved  Continue with conservative treatment at this time  · Serial exams  · OOB and ambulate  · Pain control   · Clears diet  · IV abx      VTE Pharmacologic Prophylaxis: Sequential compression device (Venodyne)  and Enoxaparin (Lovenox)    Subjective/Objective     Subjective:  Pain improved  +flatus  Denies BM, N/V    Objective/Physical Exam: Blood pressure 122/65, pulse 82, temperature 98 8 °F (37 1 °C), resp  rate 16, height 6' 1" (1 854 m), weight 112 kg (247 lb 14 4 oz), SpO2 94 %  ,Body mass index is 32 71 kg/m²      General appearance: alert and oriented, in no acute distress  Heart: regular rate and rhythm, S1, S2 normal, no murmur, click, rub or gallop  Lungs: clear to auscultation bilaterally  Abdomen: rounded and soft, tender RLQ, no rebound or guarding, BS  Neurological: normal without focal findings      Current Facility-Administered Medications:     acetaminophen (TYLENOL) tablet 650 mg, 650 mg, Oral, Q4H PRN, Zaid Soni MD, 650 mg at 01/03/19 2104    amLODIPine-benazepril (LOTREL 10/20) combo dose, , Oral, Daily, Zaid Soni MD    atorvastatin (LIPITOR) tablet 20 mg, 20 mg, Oral, Daily With Ghanshyam Chanel MD    dextrose 5 % and sodium chloride 0 45 % with KCl 20 mEq/L infusion, 125 mL/hr, Intravenous, Continuous, Zaid Soni MD, Last Rate: 125 mL/hr at 01/04/19 0634, 125 mL/hr at 01/04/19 0634    enoxaparin (LOVENOX) subcutaneous injection 40 mg, 40 mg, Subcutaneous, Daily, Zaid Soni MD    HYDROmorphone (DILAUDID) injection 0 5 mg, 0 5 mg, Intravenous, Q1H PRN, Zaid Soni MD    metroNIDAZOLE (FLAGYL) IVPB (premix) 500 mg, 500 mg, Intravenous, Q8H, Zaid Soni MD, Last Rate: 200 mL/hr at 01/04/19 0347, 500 mg at 01/04/19 0347    nicotine (NICODERM CQ) 7 mg/24hr TD 24 hr patch 1 patch, 1 patch, Transdermal, Daily, Mirza Ochoa MD    ondansetron [facility]) injection 4 mg, 4 mg, Intravenous, Q4H PRN, Mirza Ochoa MD    oxyCODONE-acetaminophen (PERCOCET) 5-325 mg per tablet 1 tablet, 1 tablet, Oral, Q4H PRN, Mirza Ochoa MD    piperacillin-tazobactam (ZOSYN) 3 375 g in sodium chloride 0 9 % 50 mL IVPB, 3 375 g, Intravenous, Q6H, Mirza Ochoa MD, Last Rate: 100 mL/hr at 01/04/19 0632, 3 375 g at 01/04/19 5183      Intake/Output Summary (Last 24 hours) at 01/04/19 0836  Last data filed at 01/03/19 1850   Gross per 24 hour   Intake                0 ml   Output              500 ml   Net             -500 ml     Lab, Imaging and other studies:  Admission on 01/03/2019   Component Date Value Ref Range Status    WBC 01/03/2019 17 66* 4 31 - 10 16 Thousand/uL Final    RBC 01/03/2019 5 13  3 88 - 5 62 Million/uL Final    Hemoglobin 01/03/2019 15 5  12 0 - 17 0 g/dL Final    Hematocrit 01/03/2019 45 3  36 5 - 49 3 % Final    MCV 01/03/2019 88  82 - 98 fL Final    MCH 01/03/2019 30 2  26 8 - 34 3 pg Final    MCHC 01/03/2019 34 2  31 4 - 37 4 g/dL Final    RDW 01/03/2019 13 4  11 6 - 15 1 % Final    MPV 01/03/2019 9 6  8 9 - 12 7 fL Final    Platelets 58/25/1025 308  149 - 390 Thousands/uL Final    nRBC 01/03/2019 0  /100 WBCs Final    Neutrophils Relative 01/03/2019 84* 43 - 75 % Final    Immat GRANS % 01/03/2019 0  0 - 2 % Final    Lymphocytes Relative 01/03/2019 9* 14 - 44 % Final    Monocytes Relative 01/03/2019 7  4 - 12 % Final    Eosinophils Relative 01/03/2019 0  0 - 6 % Final    Basophils Relative 01/03/2019 0  0 - 1 % Final    Neutrophils Absolute 01/03/2019 14 69* 1 85 - 7 62 Thousands/µL Final    Immature Grans Absolute 01/03/2019 0 07  0 00 - 0 20 Thousand/uL Final    Lymphocytes Absolute 01/03/2019 1 53  0 60 - 4 47 Thousands/µL Final    Monocytes Absolute 01/03/2019 1 28* 0 17 - 1 22 Thousand/µL Final    Eosinophils Absolute 01/03/2019 0 05  0 00 - 0 61 Thousand/µL Final    Basophils Absolute 01/03/2019 0 04  0 00 - 0 10 Thousands/µL Final    Sodium 01/03/2019 132* 136 - 145 mmol/L Final    Potassium 01/03/2019 3 6  3 5 - 5 3 mmol/L Final    Chloride 01/03/2019 95* 100 - 108 mmol/L Final    CO2 01/03/2019 26  21 - 32 mmol/L Final    ANION GAP 01/03/2019 11  4 - 13 mmol/L Final    BUN 01/03/2019 13  5 - 25 mg/dL Final    Creatinine 01/03/2019 1 14  0 60 - 1 30 mg/dL Final    Glucose 01/03/2019 104  65 - 140 mg/dL Final    Calcium 01/03/2019 9 3  8 3 - 10 1 mg/dL Final    AST 01/03/2019 16  5 - 45 U/L Final    ALT 01/03/2019 34  12 - 78 U/L Final    Alkaline Phosphatase 01/03/2019 88  46 - 116 U/L Final    Total Protein 01/03/2019 7 4  6 4 - 8 2 g/dL Final    Albumin 01/03/2019 3 5  3 5 - 5 0 g/dL Final    Total Bilirubin 01/03/2019 0 80  0 20 - 1 00 mg/dL Final    eGFR 01/03/2019 76  ml/min/1 73sq m Final    LACTIC ACID 01/03/2019 1 1  0 5 - 2 0 mmol/L Final

## 2019-01-04 NOTE — SOCIAL WORK
CM spoke with patient at the bedside re: Outpatient Observation notice  Patient signed form  Copy of form provided to the patient  All questions/concerns answered at the bedside

## 2019-01-04 NOTE — UTILIZATION REVIEW
Initial Clinical Review    Admission: Date/Time/Statement: 1/3/2019  1751 OBSERVATION  AND CHANGED TO INPATIENT  1/6/2019  0014 RE: per physician advisor  The patient continues to remain hospitalized receiving IV antibiotics  The patient cannot be discharged to home at the present time given need for continued management of appendicitis  The patient has long passed the normal and usual observation period and it was determined the patient requires further hospitalization      Given the need for further hospitalization, and along with the documentation of medical necessity present in the chart, the patient is appropriate for inpatient admission  The patient is expected to satisfy the 2 midnight benchmark, and will require further acute medical care  The patient does have comorbid conditions (diverticultis and hpt) which increases the risk for significant adverse outcome  Given this the patient is appropriate for inpatient admission    01/06/19 0014  Inpatient Admission Once     Transfer Service: Surgery-General       Question Answer Comment   Admitting Physician SYDNI VALADEZ    Level of Care Med Surg    Estimated length of stay More than 2 Midnights    Certification I certify that inpatient services are medically necessary for this patient for a duration of greater than two midnights  See H&P and MD Progress Notes for additional information about the patient's course of treatment  01/06/19 0013           ED: Date/Time/Mode of Arrival:   ED Arrival Information     Expected Arrival Acuity Means of Arrival Escorted By Service Admission Type    1/3/2019  1/3/2019 15:03 Urgent Walk-In Self Surgery-General Urgent    Arrival Complaint    RIGHT LOWER QUAD PAIN          Chief Complaint:   Chief Complaint   Patient presents with    Abdominal Pain     pt c/o RLQ pain and loss of appetite that started about 2 days ago, states it comes and goes and only hurts when you push on his belly   States it sometimes radiates into his groin/R flank  States hx of diverticulitus  Denies NVD  History of Illness:  patient states he has had pain in the right lower quadrant for up to a month  Was more severe 1 month ago, then a dissipated  He states he has chronic discomfort in the lower abdomen ever since his diverticulitis surgery  Even now he states he has no pain at rest, he does have pain with palpation and cough  On 1/4 tenderness in RLQ   ED Vital Signs:   ED Triage Vitals   Temperature Pulse Respirations Blood Pressure SpO2   01/03/19 1517 01/03/19 1514 01/03/19 1514 01/03/19 1514 01/03/19 1514   99 °F (37 2 °C) 100 18 144/83 96 %      Temp Source Heart Rate Source Patient Position - Orthostatic VS BP Location FiO2 (%)   01/03/19 1517 01/03/19 1514 01/03/19 1514 01/03/19 1514 --   Oral Monitor Sitting Right arm       Pain Score       01/03/19 1514       No Pain        Wt Readings from Last 1 Encounters:   01/03/19 112 kg (247 lb 14 4 oz)       Vital Signs (abnormal): none  01/06/19 0744  98 2 °F (36 8 °C)  81  18  141/76  96 %  None (Room air)  Lying   01/05/19 2251  98 7 °F (37 1 °C)  81  18  132/67  95 %  None (Room air)         Abnormal Labs/Diagnostic Test Results:   Na 132  Cl 95     Wbc 17 66    Ct abdomen - Advanced inflammatory changes throughout the right lower quadrant with prominent fat stranding and streaky nonloculated fluid obscuring the underlying anatomy   The appendix is not discretely identified however I favor acute appendicitis as the etiology of these inflammatory changes    1/6/2019-  Wbc 10 05    ED Treatment:   Medication Administration from 01/03/2019 1455 to 01/03/2019 1839       Date/Time Order Dose Route Action Comments     01/03/2019 1612 sodium chloride 0 9 % bolus 1,000 mL 1,000 mL Intravenous New Bag      01/03/2019 1612 ketorolac (TORADOL) injection 9 9 mg   Intravenous Given      01/03/2019 1604 albuterol inhalation solution 5 mg 5 mg Nebulization Given      01/03/2019 1726 iohexol (OMNIPAQUE) 350 MG/ML injection (SINGLE-DOSE) 100 mL 100 mL Intravenous Given           Past Medical/Surgical History:   Past Medical History:   Diagnosis Date    Diverticulitis of colon     Hypertension        Admitting Diagnosis: Acute appendicitis [K35 80]  Unspecified abdominal pain [R10 9]    Age/Sex: 53 y o  male    Assessment/Plan: This is a 53year old male from home with history of diverticulitis and hypertension who presents to ED with abdominal pain  Pain has waxed and waned for month, CT showed advanced inflammatory changes in RLQ abdomen with bowel loops thickened and matted  Bladder thickened secondary to contiguous inflammation  Patient is admitted to observation with acute exacerbation of chronic appendicitis, plan is conservative treatment with IV antibiotics, pain control, serial exams, IVF and allowed clears  On 1/4 -  Pain is improving, conservative treatment for chronic appendicitis to continue  1/5- requires continued IV antibiotics    Pain controlled    Admission Orders: 1/3/2019  1751 OBSERVATION AND CHANGED 1/6/2019  0014 INPATIENT   Scheduled Meds:   Current Facility-Administered Medications:  acetaminophen 650 mg Oral Q4H PRN    amLODIPine-benazepril (LOTREL 10/20) combo dose  Oral Daily    atorvastatin 20 mg Oral Daily With Dinner    dextrose 5 % and sodium chloride 0 45 % with KCl 20 mEq/L 125 mL/hr Intravenous Continuous Last Rate: 125 mL/hr (01/04/19 0634)   enoxaparin 40 mg Subcutaneous Daily    HYDROmorphone 0 5 mg Intravenous Q1H PRN    metroNIDAZOLE 500 mg Intravenous Q8H Last Rate: 500 mg (01/04/19 0347)   nicotine 1 patch Transdermal Daily    ondansetron 4 mg Intravenous Q4H PRN    oxyCODONE-acetaminophen 1 tablet Oral Q4H PRN    piperacillin-tazobactam 3 375 g Intravenous Q6H Last Rate: 3 375 g (01/04/19 0616)     Continuous Infusions:   dextrose 5 % and sodium chloride 0 45 % with KCl 20 mEq/L 125 mL/hr Last Rate: 125 mL/hr (01/04/19 0634)     PRN Meds: Monique Acetaminophen - used x 2  OTHER ORDERS: scds  Activity as tolerated  clears      [address] Utilization Review Department  Phone: [phone]; Fax [phone]  [email or web address]  ATTENTION: Please call with any questions or concerns to [phone]  and carefully listen to the prompts so that you are directed to the right person  Send all requests for admission clinical reviews, approved or denied determinations and any other requests to fax [phone]   All voicemails are confidential

## 2019-01-05 RX ADMIN — DEXTROSE, SODIUM CHLORIDE, AND POTASSIUM CHLORIDE 125 ML/HR: 5; .45; .15 INJECTION INTRAVENOUS at 02:45

## 2019-01-05 RX ADMIN — PIPERACILLIN SODIUM,TAZOBACTAM SODIUM 3.38 G: 3; .375 INJECTION, POWDER, FOR SOLUTION INTRAVENOUS at 12:08

## 2019-01-05 RX ADMIN — BENAZEPRIL HYDROCHLORIDE: 10 TABLET, FILM COATED ORAL at 08:13

## 2019-01-05 RX ADMIN — METRONIDAZOLE 500 MG: 500 INJECTION, SOLUTION INTRAVENOUS at 02:45

## 2019-01-05 RX ADMIN — METRONIDAZOLE 500 MG: 500 INJECTION, SOLUTION INTRAVENOUS at 11:13

## 2019-01-05 RX ADMIN — METRONIDAZOLE 500 MG: 500 INJECTION, SOLUTION INTRAVENOUS at 18:42

## 2019-01-05 RX ADMIN — PIPERACILLIN SODIUM,TAZOBACTAM SODIUM 3.38 G: 3; .375 INJECTION, POWDER, FOR SOLUTION INTRAVENOUS at 06:09

## 2019-01-05 RX ADMIN — ENOXAPARIN SODIUM 40 MG: 40 INJECTION SUBCUTANEOUS at 08:13

## 2019-01-05 RX ADMIN — PIPERACILLIN SODIUM,TAZOBACTAM SODIUM 3.38 G: 3; .375 INJECTION, POWDER, FOR SOLUTION INTRAVENOUS at 01:28

## 2019-01-05 RX ADMIN — ATORVASTATIN CALCIUM 20 MG: 20 TABLET, FILM COATED ORAL at 16:48

## 2019-01-05 RX ADMIN — PIPERACILLIN SODIUM,TAZOBACTAM SODIUM 3.38 G: 3; .375 INJECTION, POWDER, FOR SOLUTION INTRAVENOUS at 19:46

## 2019-01-05 NOTE — PROGRESS NOTES
Progress Note - General Surgery  Valentín Gomez 53 y o  male MRN: [id number]  Unit/Bed#: -01 Encounter: [id number]    Assessment/Plan:    * Chronic appendicitis   Assessment & Plan    - continue zosyn/flagyl  - CXR read f/u  - advance diet  - saline lock           Anna Hair MD PGY-5  7:49 AM  01/05/19      Subjective:  Pain is controlled  Tolerating diet  Ambulating  Objective:  Patient Vitals for the past 24 hrs:   BP Temp Temp src Pulse Resp SpO2   01/04/19 2316 116/75 98 7 °F (37 1 °C) Oral 79 18 93 %   01/04/19 1435 118/72 98 7 °F (37 1 °C) Oral 91 18 96 %          Diet Orders            Start     Ordered    01/04/19 1140  Diet Surgical; Cl Liq [place] Crax  Diet effective now     Question Answer Comment   Diet Type Surgical    Surgical Cl Liq [place]    RD to adjust diet per protocol?  Yes        01/04/19 1139    01/03/19 1845  Room Service  Once     Question:  Type of Service  Answer:  Room Service-Appropriate    01/03/19 1844          I/O       01/03 0701 - 01/04 0700 01/04 0701 - 01/05 0700 01/05 0701 - 01/06 0700    Urine (mL/kg/hr) 500      Total Output 500        Net -500                     Physical Exam:  General: NAD  Cardiovascular: RRR  Respiratory: breath sounds b/l  Abdomen: soft, mild tenderness RLQ, mild distension  Extremities: no edema    Medications:    Current Facility-Administered Medications:  acetaminophen 650 mg Oral Q4H PRN Mariluz Flor MD    amLODIPine-benazepril (LOTREL 10/20) combo dose  Oral Daily Mariluz Flor MD    atorvastatin 20 mg Oral Daily With Veena Mack MD    dextrose 5 % and sodium chloride 0 45 % with KCl 20 mEq/L 75 mL/hr Intravenous Continuous Ranulfo Butterfield MD Last Rate: 125 mL/hr (01/05/19 0245)   enoxaparin 40 mg Subcutaneous Daily Mariluz Flor MD    HYDROmorphone 0 5 mg Intravenous Q1H PRN Mariluz Flor MD    metroNIDAZOLE 500 mg Intravenous Q8H Mariluz Flor MD Last Rate: 500 mg (01/05/19 0245)   nicotine 1 patch Transdermal Daily Bon Walton MD    ondansetron 4 mg Intravenous Q4H PRN Bon Walton MD    oxyCODONE-acetaminophen 1 tablet Oral Q4H PRN Bon Walton, MD    piperacillin-tazobactam 3 375 g Intravenous Q6H Bon Walton MD Last Rate: 3 375 g (01/05/19 0609)     dextrose 5 % and sodium chloride 0 45 % with KCl 20 mEq/L 75 mL/hr Last Rate: 125 mL/hr (01/05/19 0245)     acetaminophen 650 mg Q4H PRN   HYDROmorphone 0 5 mg Q1H PRN   ondansetron 4 mg Q4H PRN   oxyCODONE-acetaminophen 1 tablet Q4H PRN       Laboratory results:   CBC: No results found for: WBC, HGB, HCT, MCV, PLT, ADJUSTEDWBC, MCH, MCHC, RDW, MPV, NRBC, CMP: No results found for: SODIUM, K, CL, CO2, ANIONGAP, BUN, CREATININE, GLUCOSE, CALCIUM, AST, ALT, ALKPHOS, PROT, BILITOT, EGFR, Coagulation: No results found for: PT, INR, APTT, Urinalysis: No results found for: COLORU, CLARITYU, SPECGRAV, PHUR, LEUKOCYTESUR, NITRITE, PROTEINUA, GLUCOSEU, KETONESU, BILIRUBINUR, BLOODU, Amylase: No results found for: AMYLASE, Lipase: No results found for: LIPASE

## 2019-01-05 NOTE — UTILIZATION REVIEW
Continued Stay Review    [address]  Utilization Review Department  Phone: [phone]; Fax [phone]  [email or web address]  ATTENTION: Please call with any questions or concerns to [phone]  and carefully listen to the prompts so that you are directed to the right person  Send all requests for admission clinical reviews, approved or denied determinations and any other requests to fax [phone]   All voicemails are confidential     Date: 1/5/2019    Vital Signs: /73   Pulse 91   Temp 98 5 °F (36 9 °C)   Resp 16   Ht 6' 1" (1 854 m)   Wt 112 kg (247 lb 14 4 oz)   SpO2 95%   BMI 32 71 kg/m²     Medications:   Scheduled Meds:   Current Facility-Administered Medications:  acetaminophen 650 mg Oral Q4H PRN 1/3 x 1  1/4 x 1    amLODIPine-benazepril (LOTREL 10/20) combo dose  Oral Daily    atorvastatin 20 mg Oral Daily With Dinner    enoxaparin 40 mg Subcutaneous Daily    HYDROmorphone 0 5 mg Intravenous Q1H PRN    metroNIDAZOLE 500 mg Intravenous Q8H    nicotine 1 patch Transdermal Daily    ondansetron 4 mg Intravenous Q4H PRN    oxyCODONE-acetaminophen 1 tablet Oral Q4H PRN    piperacillin-tazobactam 3 375 g Intravenous Q6H       D5 0 45 NS w/ 20 meq KCL @ 75 ml/hr -  D/c'd on 1/5 @ 8:58 am     Abnormal Labs/Diagnostic Results:   1/3 - Wbc 17 66 - A Neut 14 69 - Na 132 - Cl 95    Age/Sex: 53 y o  male     Assessment/Plan:  Acute on Chronic Appendicitis -  - continue zosyn/flagyl -  D/c IVF - advance diet     Discharge Plan:  TBD

## 2019-01-05 NOTE — PLAN OF CARE
DISCHARGE PLANNING - CARE MANAGEMENT     Discharge to post-acute care or home with appropriate resources Progressing        PAIN - ADULT     Verbalizes/displays adequate comfort level or baseline comfort level Progressing

## 2019-01-05 NOTE — ASSESSMENT & PLAN NOTE
- change antibiotics to augmentin + PO flagyl  - CXR from 1/4 read as normal study  - likely needs outpt colonoscopy given personal history of perforated diverticulitis requiring Doshi's procedure with subsequent ostomy takedown  - outpatient operative planning  - discharge home today

## 2019-01-06 VITALS
WEIGHT: 247.9 LBS | OXYGEN SATURATION: 96 % | HEART RATE: 81 BPM | DIASTOLIC BLOOD PRESSURE: 76 MMHG | TEMPERATURE: 98.2 F | HEIGHT: 73 IN | BODY MASS INDEX: 32.86 KG/M2 | RESPIRATION RATE: 18 BRPM | SYSTOLIC BLOOD PRESSURE: 141 MMHG

## 2019-01-06 LAB
ALBUMIN SERPL BCP-MCNC: 3.1 G/DL (ref 3.5–5)
ALP SERPL-CCNC: 76 U/L (ref 46–116)
ALT SERPL W P-5'-P-CCNC: 35 U/L (ref 12–78)
ANION GAP SERPL CALCULATED.3IONS-SCNC: 9 MMOL/L (ref 4–13)
AST SERPL W P-5'-P-CCNC: 18 U/L (ref 5–45)
BASOPHILS # BLD AUTO: 0.07 THOUSANDS/ΜL (ref 0–0.1)
BASOPHILS NFR BLD AUTO: 1 % (ref 0–1)
BILIRUB SERPL-MCNC: 0.5 MG/DL (ref 0.2–1)
BUN SERPL-MCNC: 9 MG/DL (ref 5–25)
CALCIUM SERPL-MCNC: 9.1 MG/DL (ref 8.3–10.1)
CHLORIDE SERPL-SCNC: 104 MMOL/L (ref 100–108)
CO2 SERPL-SCNC: 27 MMOL/L (ref 21–32)
CREAT SERPL-MCNC: 0.95 MG/DL (ref 0.6–1.3)
EOSINOPHIL # BLD AUTO: 0.32 THOUSAND/ΜL (ref 0–0.61)
EOSINOPHIL NFR BLD AUTO: 3 % (ref 0–6)
ERYTHROCYTE [DISTWIDTH] IN BLOOD BY AUTOMATED COUNT: 13.5 % (ref 11.6–15.1)
GFR SERPL CREATININE-BSD FRML MDRD: 95 ML/MIN/1.73SQ M
GLUCOSE SERPL-MCNC: 92 MG/DL (ref 65–140)
HCT VFR BLD AUTO: 44 % (ref 36.5–49.3)
HGB BLD-MCNC: 14.8 G/DL (ref 12–17)
IMM GRANULOCYTES # BLD AUTO: 0.04 THOUSAND/UL (ref 0–0.2)
IMM GRANULOCYTES NFR BLD AUTO: 0 % (ref 0–2)
LYMPHOCYTES # BLD AUTO: 1.44 THOUSANDS/ΜL (ref 0.6–4.47)
LYMPHOCYTES NFR BLD AUTO: 14 % (ref 14–44)
MCH RBC QN AUTO: 30.1 PG (ref 26.8–34.3)
MCHC RBC AUTO-ENTMCNC: 33.6 G/DL (ref 31.4–37.4)
MCV RBC AUTO: 89 FL (ref 82–98)
MONOCYTES # BLD AUTO: 0.88 THOUSAND/ΜL (ref 0.17–1.22)
MONOCYTES NFR BLD AUTO: 9 % (ref 4–12)
NEUTROPHILS # BLD AUTO: 7.3 THOUSANDS/ΜL (ref 1.85–7.62)
NEUTS SEG NFR BLD AUTO: 73 % (ref 43–75)
NRBC BLD AUTO-RTO: 0 /100 WBCS
PLATELET # BLD AUTO: 375 THOUSANDS/UL (ref 149–390)
PMV BLD AUTO: 9.4 FL (ref 8.9–12.7)
POTASSIUM SERPL-SCNC: 3.8 MMOL/L (ref 3.5–5.3)
PROT SERPL-MCNC: 7.1 G/DL (ref 6.4–8.2)
RBC # BLD AUTO: 4.92 MILLION/UL (ref 3.88–5.62)
SODIUM SERPL-SCNC: 140 MMOL/L (ref 136–145)
WBC # BLD AUTO: 10.05 THOUSAND/UL (ref 4.31–10.16)

## 2019-01-06 PROCEDURE — 85025 COMPLETE CBC W/AUTO DIFF WBC: CPT | Performed by: SURGERY

## 2019-01-06 PROCEDURE — 80053 COMPREHEN METABOLIC PANEL: CPT | Performed by: SURGERY

## 2019-01-06 RX ORDER — POTASSIUM CHLORIDE 20 MEQ/1
20 TABLET, EXTENDED RELEASE ORAL ONCE
Status: COMPLETED | OUTPATIENT
Start: 2019-01-06 | End: 2019-01-06

## 2019-01-06 RX ORDER — METRONIDAZOLE 500 MG/1
250 TABLET ORAL EVERY 8 HOURS SCHEDULED
Status: DISCONTINUED | OUTPATIENT
Start: 2019-01-06 | End: 2019-01-06 | Stop reason: HOSPADM

## 2019-01-06 RX ORDER — AMOXICILLIN AND CLAVULANATE POTASSIUM 875; 125 MG/1; MG/1
1 TABLET, FILM COATED ORAL EVERY 12 HOURS SCHEDULED
Qty: 28 TABLET | Refills: 0 | Status: SHIPPED | OUTPATIENT
Start: 2019-01-06 | End: 2019-01-20

## 2019-01-06 RX ORDER — METRONIDAZOLE 250 MG/1
250 TABLET ORAL EVERY 8 HOURS SCHEDULED
Qty: 42 TABLET | Refills: 0 | Status: SHIPPED | OUTPATIENT
Start: 2019-01-06 | End: 2019-01-20

## 2019-01-06 RX ORDER — AMOXICILLIN AND CLAVULANATE POTASSIUM 875; 125 MG/1; MG/1
1 TABLET, FILM COATED ORAL EVERY 12 HOURS SCHEDULED
Status: DISCONTINUED | OUTPATIENT
Start: 2019-01-06 | End: 2019-01-06 | Stop reason: HOSPADM

## 2019-01-06 RX ADMIN — BENAZEPRIL HYDROCHLORIDE: 10 TABLET, FILM COATED ORAL at 08:13

## 2019-01-06 RX ADMIN — METRONIDAZOLE 250 MG: 500 TABLET ORAL at 12:36

## 2019-01-06 RX ADMIN — PIPERACILLIN SODIUM,TAZOBACTAM SODIUM 3.38 G: 3; .375 INJECTION, POWDER, FOR SOLUTION INTRAVENOUS at 06:10

## 2019-01-06 RX ADMIN — METRONIDAZOLE 500 MG: 500 INJECTION, SOLUTION INTRAVENOUS at 02:57

## 2019-01-06 RX ADMIN — PIPERACILLIN SODIUM,TAZOBACTAM SODIUM 3.38 G: 3; .375 INJECTION, POWDER, FOR SOLUTION INTRAVENOUS at 02:11

## 2019-01-06 RX ADMIN — POTASSIUM CHLORIDE 20 MEQ: 1500 TABLET, EXTENDED RELEASE ORAL at 08:12

## 2019-01-06 RX ADMIN — ENOXAPARIN SODIUM 40 MG: 40 INJECTION SUBCUTANEOUS at 08:12

## 2019-01-06 NOTE — DISCHARGE INSTRUCTIONS
A light diet as discussed  Puddings, milk shakes, soup, Ensure, mesh potatoes, eggs, fish chicken and pasta  Please in sure that you drink plenty of liquids  Please add MiraLax tier diet to help keep the bowel movements soft through the relative narrowing in the intestine  Please avoid salads, raw vegetables, steak, pork chops for 1 month  Activity as tolerated  Please call [phone] for a follow-up office visit for 2 weeks  [address] [facility], [place] [address]  [address] between [email or web address] [address] and [employer]    Abdominal Pain   WHAT YOU NEED TO KNOW:   Abdominal pain can be dull, achy, or sharp  You may have pain in one area of your abdomen, or in your entire abdomen  Your pain may be caused by a condition such as constipation, food sensitivity or poisoning, infection, or a blockage  Abdominal pain can also be from a hernia, appendicitis, or an ulcer  Liver, gallbladder, or kidney conditions can also cause abdominal pain  The cause of your abdominal pain may be unknown  DISCHARGE INSTRUCTIONS:   Return to the emergency department if:   · You have new chest pain or shortness of breath  · You have pulsing pain in your upper abdomen or lower back that suddenly becomes constant  · Your pain is in the right lower abdominal area and worsens with movement  · You have a fever over 100 4°F (38°C) or shaking chills  · You are vomiting and cannot keep food or liquids down  · Your pain does not improve or gets worse over the next 8 to 12 hours  · You see blood in your vomit or bowel movements, or they look black and tarry  · Your skin or the whites of your eyes turn yellow  · You are a woman and have a large amount of vaginal bleeding that is not your monthly period  Contact your healthcare provider if:   · You have pain in your lower back  · You are a man and have pain in your testicles  · You have pain when you urinate       · You have questions or concerns about your condition or care  Follow up with your healthcare provider within 24 hours or as directed:  Write down your questions so you remember to ask them during your visits  Medicines:   · Medicines  may be given to calm your stomach and prevent vomiting or to decrease pain  Ask how to take pain medicine safely  · Take your medicine as directed  Contact your healthcare provider if you think your medicine is not helping or if you have side effects  Tell him of her if you are allergic to any medicine  Keep a list of the medicines, vitamins, and herbs you take  Include the amounts, and when and why you take them  Bring the list or the pill bottles to follow-up visits  Carry your medicine list with you in case of an emergency  © 2017 [address] Information is for End User's use only and may not be sold, redistributed or otherwise used for commercial purposes  All illustrations and images included in CareNotes® are the copyrighted property of [employer]  or Chuck Hebert  The above information is an  only  It is not intended as medical advice for individual conditions or treatments  Talk to your doctor, nurse or pharmacist before following any medical regimen to see if it is safe and effective for you

## 2019-01-06 NOTE — PHYSICIAN ADVISOR
Current patient class: Observation  The patient is currently on Hospital Day: 4 at [facility]      The patient was admitted to the hospital at N/A on N/A for the following diagnosis:  Acute appendicitis [K35 80]  Unspecified abdominal pain [R10 9]       There is documentation in the medical record of an expected length of stay of at least 2 midnights  The patient is therefore expected to satisfy the 2 midnight benchmark and given the 2 midnight presumption is appropriate for INPATIENT ADMISSION  Given this expectation of a satisfying stay, CMS instructs us that the patient is most often appropriate for inpatient admission under part A provided medical necessity is documented in the chart  After review of the relevant documentation, labs, vital signs and test results, the patient is appropriate for INPATIENT ADMISSION  Admission to the hospital as an inpatient is a complex decision making process which requires the practitioner to consider the patients presenting complaint, history and physical examination and all relevant testing  With this in mind, in this case, the patient was deemed appropriate for INPATIENT ADMISSION  After review of the documentation and testing available at the time of the admission I concur with this clinical determination of medical necessity  Rationale is as follows: The patient is a 53 yrs old Male who presented to the ED at 1/3/2019  3:10 PM with a chief complaint of Abdominal Pain (pt c/o RLQ pain and loss of appetite that started about 2 days ago, states it comes and goes and only hurts when you push on his belly  States it sometimes radiates into his groin/R flank  States hx of diverticulitus  Denies NVD  )     The patient continues to remain hospitalized receiving IV antibiotics  The patient cannot be discharged to home at the present time given need for continued management of appendicitis   The patient has long passed the normal and usual observation period and it was determined the patient requires further hospitalization  Given the need for further hospitalization, and along with the documentation of medical necessity present in the chart, the patient is appropriate for inpatient admission  The patient is expected to satisfy the 2 midnight benchmark, and will require further acute medical care  The patient does have comorbid conditions which increases the risk for significant adverse outcome  Given this the patient is appropriate for inpatient admission        The patients vitals on arrival were ED Triage Vitals   Temperature Pulse Respirations Blood Pressure SpO2   01/03/19 1517 01/03/19 1514 01/03/19 1514 01/03/19 1514 01/03/19 1514   99 °F (37 2 °C) 100 18 144/83 96 %      Temp Source Heart Rate Source Patient Position - Orthostatic VS BP Location FiO2 (%)   01/03/19 1517 01/03/19 1514 01/03/19 1514 01/03/19 1514 --   Oral Monitor Sitting Right arm       Pain Score       01/03/19 1514       No Pain           Past Medical History:   Diagnosis Date    Diverticulitis of colon     Hypertension      Past Surgical History:   Procedure Laterality Date    COLECTOMY             Consults have been placed to:   None    Vitals:    01/04/19 2316 01/05/19 0802 01/05/19 1541 01/05/19 2251   BP: 116/75 131/73 123/83 132/67   BP Location: Left arm  Left arm Right arm   Pulse: 79 91 90 81   Resp: 18 16 18 18   Temp: 98 7 °F (37 1 °C) 98 5 °F (36 9 °C) 99 3 °F (37 4 °C) 98 7 °F (37 1 °C)   TempSrc: Oral  Oral Oral   SpO2: 93% 95% 94% 95%   Weight:       Height:           Most recent labs:    Recent Labs      01/03/19   1609   WBC  17 66*   HGB  15 5   HCT  45 3   PLT  308   K  3 6   CALCIUM  9 3   BUN  13   CREATININE  1 14   AST  16   ALT  34   ALKPHOS  88       Scheduled Meds:  Current Facility-Administered Medications:  acetaminophen 650 mg Oral Q4H PRN Estela Shanks MD    amLODIPine-benazepril (LOTREL 10/20) combo dose  Oral Daily Estela Shanks MD atorvastatin 20 mg Oral Daily With Melida Leong MD    enoxaparin 40 mg Subcutaneous Daily Elliott Phillips MD    HYDROmorphone 0 5 mg Intravenous Q1H PRN Elliott Phillips MD    metroNIDAZOLE 500 mg Intravenous Q8H Elliott Phillips MD Last Rate: 500 mg (01/05/19 1842)   nicotine 1 patch Transdermal Daily Elliott Phillips MD    ondansetron 4 mg Intravenous Q4H PRN Elliott Phillips MD    oxyCODONE-acetaminophen 1 tablet Oral Q4H PRN Elliott Phillips MD    piperacillin-tazobactam 3 375 g Intravenous Q6H Elliott Phillips MD Last Rate: 3 375 g (01/05/19 1946)     Continuous Infusions:   PRN Meds:   acetaminophen    HYDROmorphone    ondansetron    oxyCODONE-acetaminophen    Surgical procedures (if appropriate):

## 2019-01-06 NOTE — DISCHARGE SUMMARY
Discharge Summary - Princess Martinez 53 y o  male MRN: [id number]    Unit/Bed#: -01 Encounter: [id number]    Admission Date: 1/3/2019     Discharge Date: 01/06/19    Admitting Diagnosis: Acute appendicitis [K35 80]  Unspecified abdominal pain [R10 9]    Secondary Diagnosis:   Past Medical History:   Diagnosis Date    Diverticulitis of colon     Hypertension        Discharge Diagnosis: Same    Procedures Performed:  None    Consults: None    Hospital Course:  81-year-old male who was admitted with pain in his right lower quadrant for 1 month  The patient is reported it comes and goes  He has a history of Griselda's for diverticulitis  He was admitted with a diagnosis of chronic appendicitis  He was treated with IV Zosyn and Flagyl, and by hospital day 4 he was tolerating a diet and his pain was well controlled  He was converted to p o  Augmentin and Flagyl  Plan is for him to complete 2 weeks of oral antibiotics, and follow up in the office at the end of his antibiotic course  He will likely need an outpatient colonoscopy prior to operative planning which may include appendectomy or right hemicolectomy depending on the final diagnosis  Disposition: home  Call physician for temperature over 101, wound redness or discharge, vomiting, or intolerance to diet  Discharge Medications:  See after visit summary for reconciled discharge medications provided to patient and family  This text is generated with voice recognition software  There may be translation, syntax,  or grammatical errors  If you have any questions, please contact the dictating provider

## 2019-01-06 NOTE — PROGRESS NOTES
Progress Note - General Surgery  Valentín Gomez 53 y o  male MRN: [id number]  Unit/Bed#: -01 Encounter: [id number]    Assessment/Plan:  53y o -year-old male     * Chronic appendicitis   Assessment & Plan    - change antibiotics to augmentin + PO flagyl  - CXR from 1/4 read as normal study  - likely needs outpt colonoscopy given personal history of perforated diverticulitis requiring Doshi's procedure with subsequent ostomy takedown  - outpatient operative planning  - discharge home today           Anna Hair MD PGY-5  7:35 AM  01/06/19      Subjective:  Feels well  Tolerating diet  Having bowel movements  Would like to go home  Objective:  Patient Vitals for the past 24 hrs:   BP Temp Temp src Pulse Resp SpO2   01/05/19 2251 132/67 98 7 °F (37 1 °C) Oral 81 18 95 %   01/05/19 1541 123/83 99 3 °F (37 4 °C) Oral 90 18 94 %   01/05/19 0802 131/73 98 5 °F (36 9 °C) - 91 16 95 %          Diet Orders            Start     Ordered    01/05/19 0859  Diet Surgical; Surgical Soft/Lite Meal  Diet effective now     Question Answer Comment   Diet Type Surgical    Surgical Surgical Soft/Lite Meal    RD to adjust diet per protocol? Yes        01/05/19 0858    01/03/19 1845  Room Service  Once     Question:  Type of Service  Answer:  Room Service-Appropriate    01/03/19 1844          I/O       01/04 0701 - 01/05 0700 01/05 0701 - 01/06 0700 01/06 0701 - 01/07 0700    P  O   480     Total Intake(mL/kg)  480 (4 3)     Net   +480                   Physical Exam:  General: NAD  Cardiovascular: RRR  Respiratory: breath sounds b/l  Abdomen: soft, mild tenderness RLQ, ND, healed midline laparotomy scar  Extremities: no edema    Medications:    Current Facility-Administered Medications:  acetaminophen 650 mg Oral Q4H PRN Mariluz Flor MD    amLODIPine-benazepril (LOTREL 10/20) combo dose  Oral Daily Mariluz Flor MD    atorvastatin 20 mg Oral Daily With Veena Mack MD    enoxaparin 40 mg Subcutaneous Daily Braeden Rondon MD    HYDROmorphone 0 5 mg Intravenous Q1H PRN Braeden Rondon MD    metroNIDAZOLE 500 mg Intravenous Q8H Braeden Rondon MD Last Rate: 500 mg (01/06/19 0257)   nicotine 1 patch Transdermal Daily Braeden Rondon MD    ondansetron 4 mg Intravenous Q4H PRN Braeden Rondon MD    oxyCODONE-acetaminophen 1 tablet Oral Q4H PRN Braeden Rondon MD    piperacillin-tazobactam 3 375 g Intravenous Q6H Braeden Rondon MD Last Rate: 3 375 g (01/06/19 0610)   potassium chloride 20 mEq Oral Once Gaetana Dance, MD       acetaminophen 650 mg Q4H PRN   HYDROmorphone 0 5 mg Q1H PRN   ondansetron 4 mg Q4H PRN   oxyCODONE-acetaminophen 1 tablet Q4H PRN       Laboratory results:   CBC:   Lab Results   Component Value Date    WBC 10 05 01/06/2019    HGB 14 8 01/06/2019    HCT 44 0 01/06/2019    MCV 89 01/06/2019     01/06/2019    MCH 30 1 01/06/2019    MCHC 33 6 01/06/2019    RDW 13 5 01/06/2019    MPV 9 4 01/06/2019    NRBC 0 01/06/2019   , CMP:   Lab Results   Component Value Date    SODIUM 140 01/06/2019    K 3 8 01/06/2019     01/06/2019    CO2 27 01/06/2019    BUN 9 01/06/2019    CREATININE 0 95 01/06/2019    CALCIUM 9 1 01/06/2019    AST 18 01/06/2019    ALT 35 01/06/2019    ALKPHOS 76 01/06/2019    EGFR 95 01/06/2019   , Coagulation: No results found for: PT, INR, APTT, Urinalysis: No results found for: Ival Copier, SPECGRAV, PHUR, LEUKOCYTESUR, NITRITE, PROTEINUA, GLUCOSEU, KETONESU, BILIRUBINUR, BLOODU, Amylase: No results found for: AMYLASE, Lipase: No results found for: LIPASE

## 2019-01-07 ENCOUNTER — TRANSITIONAL CARE MANAGEMENT (OUTPATIENT)
Dept: INTERNAL MEDICINE CLINIC | Facility: CLINIC | Age: 49
End: 2019-01-07

## 2019-01-07 NOTE — PROGRESS NOTES
1st attempt- tried calling pt and got message "The wireless caller you are trying to call is unavailable, please call again later" Will cb     Pt was in [facility] 1/3/19-1/6/19 for Acute appendicitis  Is pt having any current symptoms or concerns?  Pt has scheduled appt with Dr Lionel Essex on 1/11/19 (change this to TCM after confirming with pt)     TCM encounter started

## 2019-01-08 ENCOUNTER — TELEPHONE (OUTPATIENT)
Dept: INTERNAL MEDICINE CLINIC | Facility: CLINIC | Age: 49
End: 2019-01-08

## 2019-01-08 NOTE — PROGRESS NOTES
Patient called back and states no current symptoms or concerns but he received a call from a Follow Up nurse from the hospital that told him he should cancel his appointment with us on Friday, she feels it is not a good idea going out and being in the office setting because of chance of getting sick  Patient then called back and states he does have an appointment scheduled for Monday with Dr Brannon Folds

## 2019-03-07 ENCOUNTER — OFFICE VISIT (OUTPATIENT)
Dept: FAMILY MEDICINE CLINIC | Facility: CLINIC | Age: 49
End: 2019-03-07
Payer: COMMERCIAL

## 2019-03-07 VITALS
DIASTOLIC BLOOD PRESSURE: 74 MMHG | HEIGHT: 73 IN | BODY MASS INDEX: 32.6 KG/M2 | WEIGHT: 246 LBS | OXYGEN SATURATION: 96 % | SYSTOLIC BLOOD PRESSURE: 140 MMHG | HEART RATE: 98 BPM

## 2019-03-07 DIAGNOSIS — E78.2 MIXED HYPERLIPIDEMIA: Primary | ICD-10-CM

## 2019-03-07 DIAGNOSIS — Z12.5 PROSTATE CANCER SCREENING: ICD-10-CM

## 2019-03-07 DIAGNOSIS — I10 ESSENTIAL HYPERTENSION: ICD-10-CM

## 2019-03-07 PROCEDURE — 99203 OFFICE O/P NEW LOW 30 MIN: CPT | Performed by: FAMILY MEDICINE

## 2019-03-07 PROCEDURE — 3008F BODY MASS INDEX DOCD: CPT | Performed by: FAMILY MEDICINE

## 2019-03-07 RX ORDER — AMLODIPINE BESYLATE AND BENAZEPRIL HYDROCHLORIDE 10; 20 MG/1; MG/1
1 CAPSULE ORAL DAILY
Qty: 30 CAPSULE | Refills: 0 | Status: SHIPPED | OUTPATIENT
Start: 2019-03-07 | End: 2019-03-28 | Stop reason: SDUPTHER

## 2019-03-07 RX ORDER — ATORVASTATIN CALCIUM 20 MG/1
20 TABLET, FILM COATED ORAL DAILY
Qty: 30 TABLET | Refills: 0 | Status: SHIPPED | OUTPATIENT
Start: 2019-03-07 | End: 2019-03-28 | Stop reason: SDUPTHER

## 2019-03-07 NOTE — PROGRESS NOTES
Subjective:      Patient ID: Dorcas Chaparro is a 53 y o  male  Hypertension   This is a chronic problem  The current episode started more than 1 year ago  The problem is unchanged  The problem is controlled  Pertinent negatives include no blurred vision, chest pain, palpitations or shortness of breath  Treatments tried: Amlodipine benazepril 10-20 milligram daily  The current treatment provides significant improvement  There are no compliance problems  Hyperlipidemia   This is a chronic problem  The current episode started more than 1 year ago  Lipid results: Patient is due for labs  Pertinent negatives include no chest pain, myalgias or shortness of breath  Current antihyperlipidemic treatment includes statins  The current treatment provides significant improvement of lipids  Risk factors for coronary artery disease include dyslipidemia, hypertension, male sex and obesity  Past Medical History:   Diagnosis Date    Diverticulitis of colon     Hyperlipemia     Hypertension        Family History   Problem Relation Age of Onset    COPD Mother     Hyperlipidemia Mother     Hypertension Mother     COPD Father     Coronary artery disease Maternal Grandfather        Past Surgical History:   Procedure Laterality Date    COLECTOMY          reports that he has been smoking cigarettes  He has a 5 00 pack-year smoking history  He has never used smokeless tobacco  He reports that he drinks alcohol  He reports that he does not use drugs        Current Outpatient Medications:     amLODIPine-benazepril (LOTREL) 10-20 MG per capsule, Take 1 capsule by mouth daily, Disp: 30 capsule, Rfl: 0    atorvastatin (LIPITOR) 20 mg tablet, Take 1 tablet (20 mg total) by mouth daily, Disp: 30 tablet, Rfl: 0    The following portions of the patient's history were reviewed and updated as appropriate: allergies, current medications, past family history, past medical history, past social history, past surgical history and problem list     Review of Systems   Constitutional: Negative  Eyes: Negative for blurred vision  Respiratory: Negative  Negative for shortness of breath  Cardiovascular: Negative  Negative for chest pain and palpitations  Gastrointestinal: Negative  Musculoskeletal: Negative  Negative for myalgias  Neurological: Negative  Psychiatric/Behavioral: Negative  Objective:    /74 (BP Location: Left arm, Patient Position: Sitting, Cuff Size: Large)   Pulse 98   Ht 6' 1" (1 854 m)   Wt 112 kg (246 lb)   SpO2 96%   BMI 32 46 kg/m²      Physical Exam   Constitutional: He is oriented to person, place, and time  He appears well-developed and well-nourished  Cardiovascular: Normal rate, regular rhythm and normal heart sounds  Pulmonary/Chest: Effort normal and breath sounds normal    Abdominal: Soft  Bowel sounds are normal    Neurological: He is alert and oriented to person, place, and time  Psychiatric: His behavior is normal  Judgment normal          No results found for this or any previous visit (from the past 1008 hour(s))  Assessment/Plan:         Diagnoses and all orders for this visit:    Mixed hyperlipidemia  -     atorvastatin (LIPITOR) 20 mg tablet; Take 1 tablet (20 mg total) by mouth daily  -     Comprehensive metabolic panel  -     Lipid panel    Essential hypertension  -     amLODIPine-benazepril (LOTREL) 10-20 MG per capsule; Take 1 capsule by mouth daily  -     Comprehensive metabolic panel  -     Lipid panel    Prostate cancer screening  -     PSA, Total Screen; Future      patient's blood pressure is at goal continue all the medications  Advised to bring blood pressure at his next follow-up since home blood pressure values are around 120/80  Patient is denying any symptoms of orthostatic hypotension  His due for labs  Will follow up after 3 weeks to recheck blood pressure and to review labs  BMI Counseling: Body mass index is 32 46 kg/m²   Discussed the patient's BMI with him  The BMI is above average  BMI counseling and education was provided to the patient  Nutrition recommendations include reducing portion sizes, decreasing overall calorie intake, 3-5 servings of fruits/vegetables daily, reducing fast food intake, consuming healthier snacks, decreasing soda and/or juice intake, moderation in carbohydrate intake, increasing intake of lean protein, reducing intake of saturated fat and trans fat and reducing intake of cholesterol  Exercise recommendations include moderate aerobic physical activity for 150 minutes/week

## 2019-03-20 LAB
ALBUMIN SERPL-MCNC: 4.6 G/DL (ref 3.5–5.5)
ALBUMIN/GLOB SERPL: 2.1 {RATIO} (ref 1.2–2.2)
ALP SERPL-CCNC: 74 IU/L (ref 39–117)
ALT SERPL-CCNC: 28 IU/L (ref 0–44)
AST SERPL-CCNC: 18 IU/L (ref 0–40)
BILIRUB SERPL-MCNC: 0.3 MG/DL (ref 0–1.2)
BUN SERPL-MCNC: 10 MG/DL (ref 6–24)
BUN/CREAT SERPL: 12 (ref 9–20)
CALCIUM SERPL-MCNC: 9.5 MG/DL (ref 8.7–10.2)
CHLORIDE SERPL-SCNC: 100 MMOL/L (ref 96–106)
CHOLEST SERPL-MCNC: 135 MG/DL (ref 100–199)
CO2 SERPL-SCNC: 26 MMOL/L (ref 20–29)
CREAT SERPL-MCNC: 0.81 MG/DL (ref 0.76–1.27)
GLOBULIN SER-MCNC: 2.2 G/DL (ref 1.5–4.5)
GLUCOSE SERPL-MCNC: 95 MG/DL (ref 65–99)
HDLC SERPL-MCNC: 32 MG/DL
LABCORP COMMENT: NORMAL
LDLC SERPL CALC-MCNC: 86 MG/DL (ref 0–99)
POTASSIUM SERPL-SCNC: 4.3 MMOL/L (ref 3.5–5.2)
PROT SERPL-MCNC: 6.8 G/DL (ref 6–8.5)
PSA SERPL-MCNC: 2.2 NG/ML (ref 0–4)
SL AMB EGFR AFRICAN AMERICAN: 122 ML/MIN/1.73
SL AMB EGFR NON AFRICAN AMERICAN: 106 ML/MIN/1.73
SL AMB VLDL CHOLESTEROL CALC: 17 MG/DL (ref 5–40)
SODIUM SERPL-SCNC: 141 MMOL/L (ref 134–144)
TRIGL SERPL-MCNC: 85 MG/DL (ref 0–149)

## 2019-03-28 ENCOUNTER — OFFICE VISIT (OUTPATIENT)
Dept: FAMILY MEDICINE CLINIC | Facility: CLINIC | Age: 49
End: 2019-03-28
Payer: COMMERCIAL

## 2019-03-28 VITALS
HEIGHT: 73 IN | WEIGHT: 246 LBS | BODY MASS INDEX: 32.6 KG/M2 | SYSTOLIC BLOOD PRESSURE: 138 MMHG | OXYGEN SATURATION: 98 % | DIASTOLIC BLOOD PRESSURE: 72 MMHG | HEART RATE: 100 BPM

## 2019-03-28 DIAGNOSIS — E78.2 MIXED HYPERLIPIDEMIA: ICD-10-CM

## 2019-03-28 DIAGNOSIS — F17.200 SMOKER: ICD-10-CM

## 2019-03-28 DIAGNOSIS — I10 ESSENTIAL HYPERTENSION: Primary | ICD-10-CM

## 2019-03-28 PROCEDURE — 3075F SYST BP GE 130 - 139MM HG: CPT | Performed by: FAMILY MEDICINE

## 2019-03-28 PROCEDURE — 3008F BODY MASS INDEX DOCD: CPT | Performed by: FAMILY MEDICINE

## 2019-03-28 PROCEDURE — 99214 OFFICE O/P EST MOD 30 MIN: CPT | Performed by: FAMILY MEDICINE

## 2019-03-28 PROCEDURE — 3078F DIAST BP <80 MM HG: CPT | Performed by: FAMILY MEDICINE

## 2019-03-28 RX ORDER — AMLODIPINE BESYLATE AND BENAZEPRIL HYDROCHLORIDE 10; 20 MG/1; MG/1
1 CAPSULE ORAL DAILY
Qty: 90 CAPSULE | Refills: 1 | Status: SHIPPED | OUTPATIENT
Start: 2019-03-28 | End: 2019-09-26 | Stop reason: SDUPTHER

## 2019-03-28 RX ORDER — ATORVASTATIN CALCIUM 20 MG/1
20 TABLET, FILM COATED ORAL DAILY
Qty: 90 TABLET | Refills: 1 | Status: SHIPPED | OUTPATIENT
Start: 2019-03-28 | End: 2019-09-26 | Stop reason: SDUPTHER

## 2019-03-28 NOTE — ASSESSMENT & PLAN NOTE
Patient is currently smoking but wants to quit  Educate on the negative effects of Tobacco   Recommend quitting smoking  Listed cessation options,  Including smoking cessation program    Patient wants to  Make an effort on his own by reducing the number of cigarettes  If he is unable to do so then he will follow up again to discuss treatment options

## 2019-03-28 NOTE — PROGRESS NOTES
Subjective:      Patient ID: Khang Da Silva is a 53 y o  male  Hyperlipidemia   This is a chronic problem  The current episode started more than 1 year ago  The problem is controlled  Recent lipid tests were reviewed and are normal  Pertinent negatives include no chest pain, focal sensory loss, focal weakness, leg pain, myalgias or shortness of breath  Current antihyperlipidemic treatment includes statins  There are no compliance problems  Risk factors for coronary artery disease include dyslipidemia, hypertension, male sex and obesity  Hypertension   This is a chronic problem  The current episode started more than 1 year ago  The problem is controlled  Pertinent negatives include no chest pain, headaches, palpitations, peripheral edema or shortness of breath  Risk factors for coronary artery disease include dyslipidemia, male gender and obesity  Treatments tried: Amlodipine - benazepril 10-20 mg daily  The current treatment provides significant improvement  There are no compliance problems  Patient denies any urinary symptoms or changes in urine habits  Patient continues to smoke half a pack of cigarettes per day  States that he is trying to reduce the number by cutting back on cigarettes  Past Medical History:   Diagnosis Date    Diverticulitis of colon     Hyperlipemia     Hypertension        Family History   Problem Relation Age of Onset    COPD Mother     Hyperlipidemia Mother     Hypertension Mother     COPD Father     Coronary artery disease Maternal Grandfather        Past Surgical History:   Procedure Laterality Date    COLECTOMY          reports that he has been smoking cigarettes  He has a 5 00 pack-year smoking history  He has never used smokeless tobacco  He reports that he drinks alcohol  He reports that he does not use drugs        Current Outpatient Medications:     amLODIPine-benazepril (LOTREL) 10-20 MG per capsule, Take 1 capsule by mouth daily, Disp: 90 capsule, Rfl: 1    atorvastatin (LIPITOR) 20 mg tablet, Take 1 tablet (20 mg total) by mouth daily, Disp: 90 tablet, Rfl: 1    The following portions of the patient's history were reviewed and updated as appropriate: allergies, current medications, past family history, past medical history, past social history, past surgical history and problem list     Review of Systems   Constitutional: Negative  Respiratory: Negative  Negative for shortness of breath  Cardiovascular: Negative  Negative for chest pain and palpitations  Gastrointestinal: Negative  Musculoskeletal: Negative  Negative for myalgias  Neurological: Negative  Negative for focal weakness and headaches  Psychiatric/Behavioral: Negative  Objective:    /72 (BP Location: Left arm, Patient Position: Sitting, Cuff Size: Large)   Pulse 100   Ht 6' 1" (1 854 m)   Wt 112 kg (246 lb)   SpO2 98%   BMI 32 46 kg/m²      Physical Exam   Constitutional: He is oriented to person, place, and time  He appears well-developed and well-nourished  Cardiovascular: Normal rate, regular rhythm and normal heart sounds  Pulmonary/Chest: Effort normal and breath sounds normal    Abdominal: Soft  Bowel sounds are normal    Neurological: He is alert and oriented to person, place, and time     Psychiatric: His behavior is normal  Judgment normal          Recent Results (from the past 1008 hour(s))   Comprehensive metabolic panel    Collection Time: 03/19/19  9:35 AM   Result Value Ref Range    Glucose, Random 95 65 - 99 mg/dL    BUN 10 6 - 24 mg/dL    Creatinine 0 81 0 76 - 1 27 mg/dL    eGFR Non  106 >59 mL/min/1 73    eGFR  122 >59 mL/min/1 73    SL AMB BUN/CREATININE RATIO 12 9 - 20    Sodium 141 134 - 144 mmol/L    Potassium 4 3 3 5 - 5 2 mmol/L    Chloride 100 96 - 106 mmol/L    CO2 26 20 - 29 mmol/L    CALCIUM 9 5 8 7 - 10 2 mg/dL    Protein, Total 6 8 6 0 - 8 5 g/dL    Albumin 4 6 3 5 - 5 5 g/dL    Globulin, Total 2 2 1 5 - 4 5 g/dL    Albumin/Globulin Ratio 2 1 1 2 - 2 2    TOTAL BILIRUBIN 0 3 0 0 - 1 2 mg/dL    Alk Phos Isoenzymes 74 39 - 117 IU/L    AST 18 0 - 40 IU/L    ALT 28 0 - 44 IU/L   Lipid panel    Collection Time: 03/19/19  9:35 AM   Result Value Ref Range    Cholesterol, Total 135 100 - 199 mg/dL    Triglycerides 85 0 - 149 mg/dL    HDL 32 (L) >39 mg/dL    VLDL Cholesterol Calculated 17 5 - 40 mg/dL    LDL Direct 86 0 - 99 mg/dL   PSA Total, Diagnostic    Collection Time: 03/19/19  9:35 AM   Result Value Ref Range    Prostate Specific Antigen Total 2 2 0 0 - 4 0 ng/mL   Specimen Status Report    Collection Time: 03/19/19  9:35 AM   Result Value Ref Range    Comment Comment        Assessment/Plan:    BMI Counseling: Body mass index is 32 46 kg/m²  Discussed the patient's BMI with him  The BMI is above average  BMI counseling and education was provided to the patient  Nutrition recommendations include reducing portion sizes, decreasing overall calorie intake, 3-5 servings of fruits/vegetables daily, reducing fast food intake, consuming healthier snacks, decreasing soda and/or juice intake, moderation in carbohydrate intake, increasing intake of lean protein, reducing intake of saturated fat and trans fat and reducing intake of cholesterol  Exercise recommendations include moderate aerobic physical activity for 150 minutes/week  Problem List Items Addressed This Visit        Cardiovascular and Mediastinum    Essential hypertension - Primary     BP stable on amlodipine- benazepril 10-20 mg, continue same         Relevant Medications    amLODIPine-benazepril (LOTREL) 10-20 MG per capsule    Other Relevant Orders    Comprehensive metabolic panel    Lipid panel       Other    Mixed hyperlipidemia     LDL at goal on statin, continue same  6 month followup with labs advised            Relevant Medications    atorvastatin (LIPITOR) 20 mg tablet    Other Relevant Orders    Comprehensive metabolic panel    Lipid panel    Smoker      Patient is currently smoking but wants to quit  Educate on the negative effects of Tobacco   Recommend quitting smoking  Listed cessation options,  Including smoking cessation program    Patient wants to  Make an effort on his own by reducing the number of cigarettes  If he is unable to do so then he will follow up again to discuss treatment options

## 2019-04-11 ENCOUNTER — HOSPITAL ENCOUNTER (OUTPATIENT)
Facility: HOSPITAL | Age: 49
Setting detail: OUTPATIENT SURGERY
Discharge: HOME | End: 2019-04-11
Attending: SURGERY | Admitting: SURGERY
Payer: COMMERCIAL

## 2019-04-11 ENCOUNTER — ANESTHESIA (OUTPATIENT)
Dept: GASTROENTEROLOGY | Facility: HOSPITAL | Age: 49
End: 2019-04-11
Payer: COMMERCIAL

## 2019-04-11 ENCOUNTER — ANESTHESIA EVENT (OUTPATIENT)
Dept: GASTROENTEROLOGY | Facility: HOSPITAL | Age: 49
End: 2019-04-11
Payer: COMMERCIAL

## 2019-04-11 VITALS
OXYGEN SATURATION: 97 % | BODY MASS INDEX: 32.6 KG/M2 | DIASTOLIC BLOOD PRESSURE: 74 MMHG | WEIGHT: 246 LBS | SYSTOLIC BLOOD PRESSURE: 115 MMHG | HEIGHT: 73 IN | HEART RATE: 92 BPM | TEMPERATURE: 97.6 F | RESPIRATION RATE: 18 BRPM

## 2019-04-11 PROCEDURE — 45378 DIAGNOSTIC COLONOSCOPY: CPT | Performed by: SURGERY

## 2019-04-11 RX ORDER — PROPOFOL 10 MG/ML
INJECTION, EMULSION INTRAVENOUS AS NEEDED
Status: DISCONTINUED | OUTPATIENT
Start: 2019-04-11 | End: 2019-04-11 | Stop reason: SURG

## 2019-04-11 RX ORDER — SODIUM CHLORIDE 9 MG/ML
INJECTION, SOLUTION INTRAVENOUS CONTINUOUS PRN
Status: DISCONTINUED | OUTPATIENT
Start: 2019-04-11 | End: 2019-04-11 | Stop reason: SURG

## 2019-04-11 RX ADMIN — PROPOFOL 25 MG: 10 INJECTION, EMULSION INTRAVENOUS at 13:29

## 2019-04-11 RX ADMIN — PROPOFOL 50 MG: 10 INJECTION, EMULSION INTRAVENOUS at 13:32

## 2019-04-11 RX ADMIN — PROPOFOL 100 MG: 10 INJECTION, EMULSION INTRAVENOUS at 13:23

## 2019-04-11 RX ADMIN — PROPOFOL 25 MG: 10 INJECTION, EMULSION INTRAVENOUS at 13:31

## 2019-04-11 RX ADMIN — SODIUM CHLORIDE: 0.9 INJECTION, SOLUTION INTRAVENOUS at 12:00

## 2019-04-11 RX ADMIN — PROPOFOL 25 MG: 10 INJECTION, EMULSION INTRAVENOUS at 13:25

## 2019-04-11 RX ADMIN — PROPOFOL 25 MG: 10 INJECTION, EMULSION INTRAVENOUS at 13:27

## 2019-09-26 ENCOUNTER — OFFICE VISIT (OUTPATIENT)
Dept: FAMILY MEDICINE CLINIC | Facility: CLINIC | Age: 49
End: 2019-09-26
Payer: COMMERCIAL

## 2019-09-26 VITALS
HEIGHT: 73 IN | SYSTOLIC BLOOD PRESSURE: 126 MMHG | WEIGHT: 255.6 LBS | DIASTOLIC BLOOD PRESSURE: 70 MMHG | RESPIRATION RATE: 18 BRPM | BODY MASS INDEX: 33.88 KG/M2 | HEART RATE: 96 BPM | OXYGEN SATURATION: 98 %

## 2019-09-26 DIAGNOSIS — Z12.5 PROSTATE CANCER SCREENING: ICD-10-CM
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
## 2019-09-26 DIAGNOSIS — I10 ESSENTIAL HYPERTENSION: ICD-10-CM

## 2019-09-26 DIAGNOSIS — F17.200 SMOKER: ICD-10-CM

## 2019-09-26 DIAGNOSIS — E78.2 MIXED HYPERLIPIDEMIA: Primary | ICD-10-CM

## 2019-09-26 PROCEDURE — 3008F BODY MASS INDEX DOCD: CPT | Performed by: FAMILY MEDICINE

## 2019-09-26 PROCEDURE — 3074F SYST BP LT 130 MM HG: CPT | Performed by: FAMILY MEDICINE

## 2019-09-26 PROCEDURE — 99214 OFFICE O/P EST MOD 30 MIN: CPT | Performed by: FAMILY MEDICINE

## 2019-09-26 PROCEDURE — 3078F DIAST BP <80 MM HG: CPT | Performed by: FAMILY MEDICINE

## 2019-09-26 RX ORDER — AMLODIPINE BESYLATE AND BENAZEPRIL HYDROCHLORIDE 10; 20 MG/1; MG/1
1 CAPSULE ORAL DAILY
Qty: 90 CAPSULE | Refills: 1 | Status: SHIPPED | OUTPATIENT
Start: 2019-09-26 | End: 2020-03-23 | Stop reason: SDUPTHER

## 2019-09-26 RX ORDER — ATORVASTATIN CALCIUM 20 MG/1
20 TABLET, FILM COATED ORAL DAILY
Qty: 90 TABLET | Refills: 1 | Status: SHIPPED | OUTPATIENT
Start: 2019-09-26 | End: 2020-03-23 | Stop reason: SDUPTHER

## 2019-09-26 NOTE — ASSESSMENT & PLAN NOTE
LDL is at goal on atorvastatin 20 milligram   Continue same  Six-month follow-up with metabolic labs advised

## 2019-09-26 NOTE — PROGRESS NOTES
Subjective:      Patient ID: Davide Skaggs is a 51 y o  male  Hypertension   This is a chronic problem  The current episode started more than 1 year ago  The problem is controlled  Pertinent negatives include no chest pain, headaches, peripheral edema or shortness of breath  Risk factors for coronary artery disease include dyslipidemia, male gender and obesity  Treatments tried: Amlodipine- benazepril 10/20 mg daily  The current treatment provides significant improvement  There are no compliance problems  Hyperlipidemia   This is a chronic problem  The current episode started more than 1 year ago  The problem is controlled  Recent lipid tests were reviewed and are normal  Pertinent negatives include no chest pain, myalgias or shortness of breath  Current antihyperlipidemic treatment includes statins  The current treatment provides significant improvement of lipids  There are no compliance problems  Risk factors for coronary artery disease include dyslipidemia and hypertension  Patient is requesting to be tested for Lyme disease  States that his dog was recently diagnosed to have Lyme disease  He lives in [place]  Has not found a take a rash on him recently but does have a remote history of tick bite  Is reporting symptoms fatigue  Past Medical History:   Diagnosis Date    Diverticulitis of colon     Hyperlipemia     Hypertension        Family History   Problem Relation Age of Onset   [facility] COPD Mother     Hyperlipidemia Mother     Hypertension Mother     COPD Father     Coronary artery disease Maternal Grandfather        Past Surgical History:   Procedure Laterality Date    COLECTOMY      MI COLONOSCOPY FLX DX W/COLLJ SPEC WHEN PFRMD N/A 4/11/2019    Procedure: COLONOSCOPY;  Surgeon: Hattie Pham MD;  [facility]; Service: General        reports that he has been smoking cigarettes  He has a 5 00 pack-year smoking history   He has never used smokeless tobacco  He reports that he drank alcohol  He reports that he does not use drugs  Current Outpatient Medications:     amLODIPine-benazepril (LOTREL) 10-20 MG per capsule, Take 1 capsule by mouth daily, Disp: 90 capsule, Rfl: 1    atorvastatin (LIPITOR) 20 mg tablet, Take 1 tablet (20 mg total) by mouth daily, Disp: 90 tablet, Rfl: 1    The following portions of the patient's history were reviewed and updated as appropriate: allergies, current medications, past family history, past medical history, past social history, past surgical history and problem list     Review of Systems   Constitutional: Negative  Respiratory: Negative  Negative for shortness of breath  Cardiovascular: Negative  Negative for chest pain  Gastrointestinal: Negative  Musculoskeletal: Negative  Negative for myalgias  Neurological: Negative  Negative for headaches  Psychiatric/Behavioral: Negative  Objective:    /70 (BP Location: Left arm, Patient Position: Sitting, Cuff Size: Large)   Pulse 96   Resp 18   Ht 6' 1" (1 854 m)   Wt 116 kg (255 lb 9 6 oz)   SpO2 98%   BMI 33 72 kg/m²      Physical Exam   Constitutional: He is oriented to person, place, and time  He appears well-developed and well-nourished  Cardiovascular: Normal rate, regular rhythm and normal heart sounds  Pulmonary/Chest: Effort normal and breath sounds normal    Abdominal: Soft  Bowel sounds are normal    Neurological: He is alert and oriented to person, place, and time  Psychiatric: His behavior is normal  Judgment normal          No results found for this or any previous visit (from the past 1008 hour(s))  Assessment/Plan:             Problem List Items Addressed This Visit        Cardiovascular and Mediastinum    Essential hypertension     Blood pressure is at goal on amlodipine benazepril 10/20 milligram daily  Continue same             Relevant Medications    amLODIPine-benazepril (LOTREL) 10-20 MG per capsule    Other Relevant Orders Comprehensive metabolic panel    Microalbumin / creatinine urine ratio       Musculoskeletal and Integument    Tick bite    Relevant Orders    Lyme Antibody Profile with reflex to WB       Other    Mixed hyperlipidemia - Primary     LDL is at goal on atorvastatin 20 milligram   Continue same  Six-month follow-up with metabolic labs advised  Relevant Medications    atorvastatin (LIPITOR) 20 mg tablet    Other Relevant Orders    Lipid panel    Smoker      Patient is currently smoking but wants to quit  Educate on the negative effects of Tobacco   Recommend quitting smoking  Listed cessation options,  Including smoking cessation program    Patient wants to  Make an effort on his own by reducing the number of cigarettes  If he is unable to do so then he will follow up again to discuss treatment options                 Prostate cancer screening    Relevant Orders    PSA, Total Screen

## 2020-03-23 DIAGNOSIS — I10 ESSENTIAL HYPERTENSION: ICD-10-CM

## 2020-03-23 DIAGNOSIS — E78.2 MIXED HYPERLIPIDEMIA: ICD-10-CM

## 2020-03-24 DIAGNOSIS — I10 ESSENTIAL HYPERTENSION: ICD-10-CM

## 2020-03-24 DIAGNOSIS — E78.2 MIXED HYPERLIPIDEMIA: ICD-10-CM

## 2020-03-25 RX ORDER — AMLODIPINE BESYLATE AND BENAZEPRIL HYDROCHLORIDE 10; 20 MG/1; MG/1
CAPSULE ORAL
Qty: 90 CAPSULE | Refills: 1 | OUTPATIENT
Start: 2020-03-25

## 2020-03-25 RX ORDER — AMLODIPINE BESYLATE AND BENAZEPRIL HYDROCHLORIDE 10; 20 MG/1; MG/1
1 CAPSULE ORAL DAILY
Qty: 90 CAPSULE | Refills: 0 | Status: SHIPPED | OUTPATIENT
Start: 2020-03-25 | End: 2020-06-29 | Stop reason: SDUPTHER

## 2020-03-25 RX ORDER — ATORVASTATIN CALCIUM 20 MG/1
20 TABLET, FILM COATED ORAL DAILY
Qty: 90 TABLET | Refills: 0 | Status: SHIPPED | OUTPATIENT
Start: 2020-03-25 | End: 2020-06-29 | Stop reason: SDUPTHER

## 2020-03-25 RX ORDER — ATORVASTATIN CALCIUM 20 MG/1
TABLET, FILM COATED ORAL
Qty: 90 TABLET | Refills: 1 | OUTPATIENT
Start: 2020-03-25

## 2020-06-26 LAB
CREAT ?TM UR-SCNC: 27.1 UMOL/L
EXT MICROALBUMIN URINE RANDOM: 0.9

## 2020-06-29 DIAGNOSIS — I10 ESSENTIAL HYPERTENSION: ICD-10-CM

## 2020-06-29 DIAGNOSIS — E78.2 MIXED HYPERLIPIDEMIA: ICD-10-CM

## 2020-06-30 RX ORDER — AMLODIPINE BESYLATE AND BENAZEPRIL HYDROCHLORIDE 10; 20 MG/1; MG/1
1 CAPSULE ORAL DAILY
Qty: 90 CAPSULE | Refills: 0 | Status: SHIPPED | OUTPATIENT
Start: 2020-06-30 | End: 2020-07-09 | Stop reason: SDUPTHER

## 2020-06-30 RX ORDER — ATORVASTATIN CALCIUM 20 MG/1
20 TABLET, FILM COATED ORAL DAILY
Qty: 90 TABLET | Refills: 0 | Status: SHIPPED | OUTPATIENT
Start: 2020-06-30 | End: 2020-07-09 | Stop reason: SDUPTHER

## 2020-07-02 ENCOUNTER — OFFICE VISIT (OUTPATIENT)
Dept: FAMILY MEDICINE CLINIC | Facility: CLINIC | Age: 50
End: 2020-07-02
Payer: COMMERCIAL

## 2020-07-02 VITALS
DIASTOLIC BLOOD PRESSURE: 100 MMHG | SYSTOLIC BLOOD PRESSURE: 160 MMHG | TEMPERATURE: 97.8 F | HEIGHT: 73 IN | RESPIRATION RATE: 16 BRPM | BODY MASS INDEX: 33.13 KG/M2 | HEART RATE: 88 BPM | WEIGHT: 250 LBS

## 2020-07-02 DIAGNOSIS — I10 ESSENTIAL HYPERTENSION: ICD-10-CM

## 2020-07-02 DIAGNOSIS — E78.2 MIXED HYPERLIPIDEMIA: Primary | ICD-10-CM

## 2020-07-02 DIAGNOSIS — R74.01 ELEVATED ALT MEASUREMENT: ICD-10-CM
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
## 2020-07-02 PROCEDURE — 3008F BODY MASS INDEX DOCD: CPT | Performed by: FAMILY MEDICINE

## 2020-07-02 PROCEDURE — 99214 OFFICE O/P EST MOD 30 MIN: CPT | Performed by: FAMILY MEDICINE

## 2020-07-02 PROCEDURE — 1036F TOBACCO NON-USER: CPT | Performed by: FAMILY MEDICINE

## 2020-07-02 PROCEDURE — 3080F DIAST BP >= 90 MM HG: CPT | Performed by: FAMILY MEDICINE

## 2020-07-02 PROCEDURE — 3077F SYST BP >= 140 MM HG: CPT | Performed by: FAMILY MEDICINE

## 2020-07-02 NOTE — PROGRESS NOTES
Subjective:      Patient ID: Brigitte Goldman is a 53 y o  male  Hypertension   This is a new problem  The current episode started more than 1 year ago  The problem is uncontrolled  Pertinent negatives include no chest pain, headaches, palpitations, peripheral edema or shortness of breath  Risk factors for coronary artery disease include dyslipidemia, male gender and obesity  Treatments tried: Amlodipine benazepril 10/20 milligram daily  The current treatment provides no improvement  There are no compliance problems  Hyperlipidemia   This is a chronic problem  The current episode started more than 1 year ago  The problem is controlled  Recent lipid tests were reviewed and are normal  Pertinent negatives include no chest pain, myalgias or shortness of breath  Current antihyperlipidemic treatment includes statins  The current treatment provides significant improvement of lipids  There are no compliance problems  Risk factors for coronary artery disease include dyslipidemia, hypertension, male sex and obesity  Past Medical History:   Diagnosis Date    Diverticulitis of colon     Hyperlipemia     Hypertension        Family History   Problem Relation Age of Onset   Monique COPD Mother     Hyperlipidemia Mother     Hypertension Mother     COPD Father     Coronary artery disease Maternal Grandfather        Past Surgical History:   Procedure Laterality Date    COLECTOMY      GA COLONOSCOPY FLX DX W/COLLJ SPEC WHEN PFRMD N/A 4/11/2019    Procedure: COLONOSCOPY;  Surgeon: Jory Gomez MD;  [facility]; Service: General        reports that he has quit smoking  His smoking use included cigarettes  He has a 5 00 pack-year smoking history  He quit smokeless tobacco use about 4 months ago  He reports that he drank alcohol  He reports that he does not use drugs        Current Outpatient Medications:     amLODIPine-benazepril (LOTREL) 10-20 MG per capsule, Take 1 capsule by mouth daily, Disp: 90 capsule, Rfl: 0    atorvastatin (LIPITOR) 20 mg tablet, Take 1 tablet (20 mg total) by mouth daily, Disp: 90 tablet, Rfl: 0    The following portions of the patient's history were reviewed and updated as appropriate: allergies, current medications, past family history, past medical history, past social history, past surgical history and problem list     Review of Systems   Constitutional: Negative  Respiratory: Negative  Negative for shortness of breath  Cardiovascular: Negative  Negative for chest pain and palpitations  Gastrointestinal: Negative  Endocrine: Negative  Genitourinary: Negative  Musculoskeletal: Negative  Negative for myalgias  Allergic/Immunologic: Negative  Neurological: Negative  Negative for headaches  Psychiatric/Behavioral: Negative  Objective:    /82   Pulse 88   Temp 97 8 °F (36 6 °C) (Tympanic)   Resp 16   Ht 6' 1" (1 854 m)   Wt 113 kg (250 lb)   BMI 32 98 kg/m²      Physical Exam   Constitutional: He is oriented to person, place, and time  He appears well-developed and well-nourished  HENT:   Mouth/Throat: Oropharynx is clear and moist  No oropharyngeal exudate  Eyes: Pupils are equal, round, and reactive to light  Neck: Normal range of motion  Cardiovascular: Normal rate and regular rhythm  Pulmonary/Chest: Effort normal and breath sounds normal    Abdominal: Soft  Bowel sounds are normal    Musculoskeletal: Normal range of motion  Neurological: He is alert and oriented to person, place, and time  Psychiatric: His behavior is normal  Judgment normal          No results found for this or any previous visit (from the past 1008 hour(s))  Assessment/Plan:       Problem List Items Addressed This Visit        Cardiovascular and Mediastinum    Essential hypertension     Patient's blood pressure is elevated today  States that he is very nervous being here due to [place] pandemic    He is currently on amlodipine benazepril 10/20 milligram  Patient does not want to take any additional medications yet  Denies any history of headache or chest pain  Advised to monitor his blood pressure at home  Will follow up after 1 week we a virtual visit for blood pressure check, since patient is not comfortable coming to the office  Advised to go to the ER if  blood pressure is persistently elevated or if he develops any symptoms of headache or chest pain         Relevant Orders    Comprehensive metabolic panel       Musculoskeletal and Integument    Tick bite     Requesting to be tested for Lyme         Relevant Orders    Lyme Antibody Profile with reflex to WB       Other    Mixed hyperlipidemia - Primary     Continue atorvastatin 20 milligram          Relevant Orders    Lipid panel    Elevated ALT measurement     Patient  advised low-fat diet and regular exercise  Continue monitoring         BMI 32 0-32 9,adult     BMI Counseling: Body mass index is 32 98 kg/m²  The BMI is above normal  Nutrition recommendations include reducing portion sizes, decreasing overall calorie intake, 3-5 servings of fruits/vegetables daily and reducing fast food intake  Exercise recommendations include moderate aerobic physical activity for 150 minutes/week

## 2020-07-02 NOTE — ASSESSMENT & PLAN NOTE
BMI Counseling: Body mass index is 32 98 kg/m²  The BMI is above normal  Nutrition recommendations include reducing portion sizes, decreasing overall calorie intake, 3-5 servings of fruits/vegetables daily and reducing fast food intake  Exercise recommendations include moderate aerobic physical activity for 150 minutes/week

## 2020-07-02 NOTE — ASSESSMENT & PLAN NOTE
Patient's blood pressure is elevated today  States that he is very nervous being here due to [place] pandemic  He is currently on amlodipine benazepril 10/20 milligram   Patient does not want to take any additional medications yet  Denies any history of headache or chest pain  Advised to monitor his blood pressure at home  Will follow up after 1 week we a virtual visit for blood pressure check, since patient is not comfortable coming to the office    Advised to go to the ER if  blood pressure is persistently elevated or if he develops any symptoms of headache or chest pain

## 2020-07-09 ENCOUNTER — TELEMEDICINE (OUTPATIENT)
Dept: FAMILY MEDICINE CLINIC | Facility: CLINIC | Age: 50
End: 2020-07-09
Payer: COMMERCIAL

## 2020-07-09 VITALS — DIASTOLIC BLOOD PRESSURE: 77 MMHG | SYSTOLIC BLOOD PRESSURE: 126 MMHG

## 2020-07-09 DIAGNOSIS — E78.2 MIXED HYPERLIPIDEMIA: ICD-10-CM

## 2020-07-09 DIAGNOSIS — I10 ESSENTIAL HYPERTENSION: ICD-10-CM

## 2020-07-09 PROCEDURE — 3074F SYST BP LT 130 MM HG: CPT | Performed by: FAMILY MEDICINE

## 2020-07-09 PROCEDURE — 99213 OFFICE O/P EST LOW 20 MIN: CPT | Performed by: FAMILY MEDICINE

## 2020-07-09 PROCEDURE — 3078F DIAST BP <80 MM HG: CPT | Performed by: FAMILY MEDICINE

## 2020-07-09 PROCEDURE — 1036F TOBACCO NON-USER: CPT | Performed by: FAMILY MEDICINE

## 2020-07-09 RX ORDER — ATORVASTATIN CALCIUM 20 MG/1
20 TABLET, FILM COATED ORAL DAILY
Qty: 90 TABLET | Refills: 0 | Status: SHIPPED | OUTPATIENT
Start: 2020-07-09 | End: 2021-01-22

## 2020-07-09 RX ORDER — AMLODIPINE BESYLATE AND BENAZEPRIL HYDROCHLORIDE 10; 20 MG/1; MG/1
1 CAPSULE ORAL DAILY
Qty: 90 CAPSULE | Refills: 0 | Status: SHIPPED | OUTPATIENT
Start: 2020-07-09 | End: 2020-12-23

## 2020-07-09 NOTE — PROGRESS NOTES
Virtual Regular Visit      Assessment/Plan:    Problem List Items Addressed This Visit        Cardiovascular and Mediastinum    Essential hypertension     Improved significantly, back to baseline  Continue amlodipine benazepril 10/20 milligram daily  Relevant Medications    amLODIPine-benazepril (LOTREL) 10-20 MG per capsule       Other    Mixed hyperlipidemia     LDL is at goal   Continue atorvastatin 20 milligram   Metabolic labs at 6 month interval follow-up thereafter  Relevant Medications    atorvastatin (LIPITOR) 20 mg tablet               Reason for visit is   Chief Complaint   Patient presents with    Virtual Regular Visit        Encounter provider Erica Wright MD    Provider located at [address] [id number]      Recent Visits  Date Type Provider Dept   07/02/20 Office Visit Erica Wright MD [facility]   Showing recent visits within past 7 days and meeting all other requirements     Today's Visits  Date Type Provider Dept   07/09/20 Telemedicine Erica Wright MD [facility]   Showing today's visits and meeting all other requirements     Future Appointments  No visits were found meeting these conditions  Showing future appointments within next 150 days and meeting all other requirements        The patient was identified by name and date of birth  Abdiel Burns was informed that this is a telemedicine visit and that the visit is being conducted through [facility] and patient was informed that this is a secure, HIPAA-compliant platform  He agrees to proceed     My office door was closed  No one else was in the room  He acknowledged consent and understanding of privacy and security of the video platform  The patient has agreed to participate and understands they can discontinue the visit at any time  Patient is aware this is a billable service       Subjective  Abdiel Burns is a 53 y o  male        Hypertension   This is a chronic problem  The current episode started more than 1 year ago  The problem has been gradually improving since onset  The problem is controlled  Pertinent negatives include no chest pain, headaches, palpitations, peripheral edema or shortness of breath  Risk factors for coronary artery disease include dyslipidemia and male gender  Treatments tried: Amlodipine benazepril 10/20 milligram daily  The current treatment provides significant improvement  There are no compliance problems  Past Medical History:   Diagnosis Date    Diverticulitis of colon     Hyperlipemia     Hypertension        Past Surgical History:   Procedure Laterality Date    COLECTOMY      [place] COLONOSCOPY FLX DX W/COLLJ SPEC WHEN PFRMD N/A 4/11/2019    Procedure: COLONOSCOPY;  Surgeon: Ezra Torres MD;  [facility]; Service: General       Current Outpatient Medications   Medication Sig Dispense Refill    amLODIPine-benazepril (LOTREL) 10-20 MG per capsule Take 1 capsule by mouth daily 90 capsule 0    atorvastatin (LIPITOR) 20 mg tablet Take 1 tablet (20 mg total) by mouth daily 90 tablet 0     No current facility-administered medications for this visit  No Known Allergies    Review of Systems   Constitutional: Negative  Respiratory: Negative  Negative for shortness of breath  Cardiovascular: Negative  Negative for chest pain and palpitations  Gastrointestinal: Negative  Musculoskeletal: Negative  Negative for myalgias  Neurological: Negative  Negative for headaches  Psychiatric/Behavioral: Negative  Video Exam    Vitals:    07/09/20 1351   BP: 126/77       Physical Exam   Constitutional: He is oriented to person, place, and time  He appears well-developed and well-nourished  No distress  Pulmonary/Chest: Effort normal  No respiratory distress  Neurological: He is alert and oriented to person, place, and time  Psychiatric: He has a normal mood and affect  His behavior is normal  Judgment and thought content normal         I spent 15 minutes with patient today in which greater than 50% of the time was spent in counseling/coordination of care regarding Management of symptoms, home blood pressure monitoring  VIRTUAL VISIT DISCLAIMER    Delores Maldonado acknowledges that he has consented to an online visit or consultation  He understands that the online visit is based solely on information provided by him, and that, in the absence of a face-to-face physical evaluation by the physician, the diagnosis he receives is both limited and provisional in terms of accuracy and completeness  This is not intended to replace a full medical face-to-face evaluation by the physician  Delores Maldonado understands and accepts these terms

## 2020-07-09 NOTE — ASSESSMENT & PLAN NOTE
LDL is at goal   Continue atorvastatin 20 milligram   Metabolic labs at 6 month interval follow-up thereafter

## 2020-12-23 DIAGNOSIS — I10 ESSENTIAL HYPERTENSION: ICD-10-CM

## 2020-12-23 RX ORDER — AMLODIPINE BESYLATE AND BENAZEPRIL HYDROCHLORIDE 10; 20 MG/1; MG/1
CAPSULE ORAL
Qty: 30 CAPSULE | Refills: 0 | Status: SHIPPED | OUTPATIENT
Start: 2020-12-23 | End: 2021-01-22

## 2020-12-23 NOTE — TELEPHONE ENCOUNTER
Patient due for routine follow-up with labs  Please let me know he needs medication  Can be called in

## 2020-12-24 DIAGNOSIS — I10 ESSENTIAL HYPERTENSION: ICD-10-CM

## 2020-12-29 RX ORDER — AMLODIPINE BESYLATE AND BENAZEPRIL HYDROCHLORIDE 10; 20 MG/1; MG/1
CAPSULE ORAL
Qty: 90 CAPSULE | Refills: 0 | OUTPATIENT
Start: 2020-12-29

## 2021-01-22 DIAGNOSIS — I10 ESSENTIAL HYPERTENSION: ICD-10-CM

## 2021-01-22 DIAGNOSIS — E78.2 MIXED HYPERLIPIDEMIA: ICD-10-CM

## 2021-01-22 RX ORDER — AMLODIPINE BESYLATE AND BENAZEPRIL HYDROCHLORIDE 10; 20 MG/1; MG/1
CAPSULE ORAL
Qty: 30 CAPSULE | Refills: 0 | Status: SHIPPED | OUTPATIENT
Start: 2021-01-22 | End: 2021-01-27 | Stop reason: SDUPTHER

## 2021-01-22 RX ORDER — ATORVASTATIN CALCIUM 20 MG/1
TABLET, FILM COATED ORAL
Qty: 90 TABLET | Refills: 0 | Status: SHIPPED | OUTPATIENT
Start: 2021-01-22 | End: 2021-01-27 | Stop reason: SDUPTHER

## 2021-01-27 ENCOUNTER — TELEMEDICINE (OUTPATIENT)
Dept: FAMILY MEDICINE CLINIC | Facility: CLINIC | Age: 51
End: 2021-01-27
Payer: COMMERCIAL

## 2021-01-27 VITALS
HEIGHT: 73 IN | SYSTOLIC BLOOD PRESSURE: 125 MMHG | BODY MASS INDEX: 33.13 KG/M2 | WEIGHT: 250 LBS | DIASTOLIC BLOOD PRESSURE: 80 MMHG

## 2021-01-27 DIAGNOSIS — R74.01 ELEVATED ALT MEASUREMENT: ICD-10-CM

## 2021-01-27 DIAGNOSIS — I10 ESSENTIAL HYPERTENSION: ICD-10-CM

## 2021-01-27 DIAGNOSIS — E78.2 MIXED HYPERLIPIDEMIA: Primary | ICD-10-CM

## 2021-01-27 PROCEDURE — 3074F SYST BP LT 130 MM HG: CPT | Performed by: FAMILY MEDICINE

## 2021-01-27 PROCEDURE — 3725F SCREEN DEPRESSION PERFORMED: CPT | Performed by: FAMILY MEDICINE

## 2021-01-27 PROCEDURE — 1036F TOBACCO NON-USER: CPT | Performed by: FAMILY MEDICINE

## 2021-01-27 PROCEDURE — 3008F BODY MASS INDEX DOCD: CPT | Performed by: FAMILY MEDICINE

## 2021-01-27 PROCEDURE — 3079F DIAST BP 80-89 MM HG: CPT | Performed by: FAMILY MEDICINE

## 2021-01-27 PROCEDURE — 99214 OFFICE O/P EST MOD 30 MIN: CPT | Performed by: FAMILY MEDICINE

## 2021-01-27 RX ORDER — ATORVASTATIN CALCIUM 20 MG/1
20 TABLET, FILM COATED ORAL DAILY
Qty: 90 TABLET | Refills: 1 | Status: SHIPPED | OUTPATIENT
Start: 2021-01-27 | End: 2021-03-03 | Stop reason: SDUPTHER

## 2021-01-27 RX ORDER — AMLODIPINE BESYLATE AND BENAZEPRIL HYDROCHLORIDE 10; 20 MG/1; MG/1
1 CAPSULE ORAL DAILY
Qty: 90 CAPSULE | Refills: 1 | Status: SHIPPED | OUTPATIENT
Start: 2021-01-27 | End: 2021-02-21

## 2021-01-27 NOTE — ASSESSMENT & PLAN NOTE
BMI Counseling: Body mass index is 32 98 kg/m²  The BMI is above normal  Nutrition recommendations include reducing portion sizes and decreasing overall calorie intake  Exercise recommendations include moderate aerobic physical activity for 150 minutes/week

## 2021-01-27 NOTE — PROGRESS NOTES
Virtual Regular Visit      Assessment/Plan:    Problem List Items Addressed This Visit        Cardiovascular and Mediastinum    Essential hypertension     Blood pressure is at goal   Continue amlodipine benazepril 10/20 milligram          Relevant Medications    amLODIPine-benazepril (LOTREL) 10-20 MG per capsule    Other Relevant Orders    Comprehensive metabolic panel       Other    Mixed hyperlipidemia - Primary     LDL is at goal   Continue atorvastatin 20 milligram          Relevant Medications    atorvastatin (LIPITOR) 20 mg tablet    Other Relevant Orders    Comprehensive metabolic panel    Elevated ALT measurement     Liver enzymes are elevated due to recent increase consumption of alcohol  Patient states that he is aware and would like to cut back on his alcohol consumption  CT scan in 2019 reveals normal liver  Will recheck liver enzymes at 3 month interval  and follow up thereafter  Relevant Orders    Comprehensive metabolic panel          BMI Counseling: Body mass index is 32 98 kg/m²  The BMI is above normal  Nutrition recommendations include decreasing portion sizes, encouraging healthy choices of fruits and vegetables and decreasing fast food intake  Exercise recommendations include moderate physical activity 150 minutes/week  No pharmacotherapy was ordered  Reason for visit is   Chief Complaint   Patient presents with    Follow-up    Virtual Regular Visit        Encounter provider Krystina Aldana MD    Provider located at [address] [place] [phone]      Recent Visits  No visits were found meeting these conditions     Showing recent visits within past 7 days and meeting all other requirements     Today's Visits  Date Type Provider Dept   01/27/21 Telemedicine Krystina Aldana MD [facility]   Showing today's visits and meeting all other requirements     Future Appointments  No visits were found meeting these conditions  Showing future appointments within next 150 days and meeting all other requirements        The patient was identified by name and date of birth  Monse Landry was informed that this is a telemedicine visit and that the visit is being conducted through [facility] and patient was informed that this is a secure, HIPAA-compliant platform  He agrees to proceed     My office door was closed  No one else was in the room  He acknowledged consent and understanding of privacy and security of the video platform  The patient has agreed to participate and understands they can discontinue the visit at any time  Patient is aware this is a billable service  Subjective  Monse Landry is a 53 y o  male    Hypertension  This is a chronic problem  The current episode started more than 1 year ago  The problem is controlled  Pertinent negatives include no chest pain, headaches, palpitations or shortness of breath  Risk factors for coronary artery disease include male gender and obesity  Treatments tried: Amlodipine benazepril 10/20  The current treatment provides significant improvement  There are no compliance problems  Hyperlipidemia  This is a chronic problem  The current episode started more than 1 year ago  The problem is controlled  Recent lipid tests were reviewed and are normal  Exacerbating diseases include liver disease (Elevated liver enzymes)  Pertinent negatives include no chest pain, myalgias or shortness of breath  Current antihyperlipidemic treatment includes statins  The current treatment provides significant improvement of lipids  There are no compliance problems  Risk factors for coronary artery disease include hypertension, male sex and dyslipidemia  patient states that he recently lost his younger brother  His alcohol consumption has increased due to increased stress  Different treatment options discussed with patient    Patient does not want to take any medications yet for anxiety  Patient states that he feels motivated enough to reduce his alcohol consumption  Is liver enzymes are elevated  Patient had a CT scan the abdomen in 2019 which revealed normal liver  Encouraged to quit alcohol  Past Medical History:   Diagnosis Date    Diverticulitis of colon     Hyperlipemia     Hypertension        Past Surgical History:   Procedure Laterality Date    COLECTOMY      [place] COLONOSCOPY FLX DX W/COLLJ SPEC WHEN PFRMD N/A 4/11/2019    Procedure: COLONOSCOPY;  Surgeon: Praveen Orozco MD;  [facility]; Service: General       Current Outpatient Medications   Medication Sig Dispense Refill    amLODIPine-benazepril (LOTREL) 10-20 MG per capsule Take 1 capsule by mouth daily 90 capsule 1    atorvastatin (LIPITOR) 20 mg tablet Take 1 tablet (20 mg total) by mouth daily 90 tablet 1     No current facility-administered medications for this visit  No Known Allergies    Review of Systems   Constitutional: Negative  Respiratory: Negative  Negative for shortness of breath  Cardiovascular: Negative  Negative for chest pain and palpitations  Gastrointestinal: Negative  Endocrine: Negative  Genitourinary: Negative  Musculoskeletal: Negative  Negative for myalgias  Allergic/Immunologic: Negative  Neurological: Negative  Negative for headaches  Psychiatric/Behavioral: Negative  Video Exam    Vitals:    01/27/21 1122   BP: 125/80   Weight: 113 kg (250 lb)   Height: 6' 1" (1 854 m)       Physical Exam  Constitutional:       General: He is not in acute distress  Appearance: He is well-developed  Pulmonary:      Effort: Pulmonary effort is normal  No respiratory distress  Neurological:      Mental Status: He is alert and oriented to person, place, and time  Psychiatric:         Behavior: Behavior normal          Thought Content:  Thought content normal          Judgment: Judgment normal         ]  I spent 25 minutes with patient today in which greater than 50% of the time was spent in counseling/coordination of care regarding Reviewing labs, management of symptoms  Patient encouraged to quit alcohol      VIRTUAL VISIT DISCLAIMER    Anna Morrison acknowledges that he has consented to an online visit or consultation  He understands that the online visit is based solely on information provided by him, and that, in the absence of a face-to-face physical evaluation by the physician, the diagnosis he receives is both limited and provisional in terms of accuracy and completeness  This is not intended to replace a full medical face-to-face evaluation by the physician  Anna Morrison understands and accepts these terms

## 2021-01-27 NOTE — ASSESSMENT & PLAN NOTE
Liver enzymes are elevated due to recent increase consumption of alcohol  Patient states that he is aware and would like to cut back on his alcohol consumption  CT scan in 2019 reveals normal liver  Will recheck liver enzymes at 3 month interval  and follow up thereafter

## 2021-01-29 ENCOUNTER — TELEPHONE (OUTPATIENT)
Dept: FAMILY MEDICINE CLINIC | Facility: CLINIC | Age: 51
End: 2021-01-29

## 2021-01-29 NOTE — TELEPHONE ENCOUNTER
Patient called stating he talked to dr Ramon Wright on 01/27/2021 and she offered a antibiotic and he was on the fence about getting it but has decided that he would like one after all   I explained the  is not in office anymore and will send a message back

## 2021-02-03 DIAGNOSIS — F41.9 ANXIETY: Primary | ICD-10-CM

## 2021-02-03 RX ORDER — ESCITALOPRAM OXALATE 5 MG/1
5 TABLET ORAL DAILY
Qty: 30 TABLET | Refills: 0 | Status: SHIPPED | OUTPATIENT
Start: 2021-02-03 | End: 2021-03-03

## 2021-02-03 NOTE — TELEPHONE ENCOUNTER
I can start him on low dose lexapro 5 mg  He has to take one tablet daily  Will fup x 4 weeks for Review of symptoms/ sooner follow-up if symptoms worsen      Please advised him to avoid taking over-the-counter NSAIDs, since both the medications increase the risk of GI bleeding    Please let me know if he is okay with that and I can send the medication to his local pharmacy

## 2021-02-03 NOTE — TELEPHONE ENCOUNTER
The medication that was offered is for anxiety/ lack of sleep due to anxiety  Melissa Serum it was discussed in virtual visit and he declined but he feels like he made a mistake and wants to try it

## 2021-02-03 NOTE — TELEPHONE ENCOUNTER
Patient would like this sent to local [facility] pharmacy   susy a virtual follow up for 3/3/2021

## 2021-02-21 DIAGNOSIS — I10 ESSENTIAL HYPERTENSION: ICD-10-CM

## 2021-02-21 RX ORDER — AMLODIPINE BESYLATE AND BENAZEPRIL HYDROCHLORIDE 10; 20 MG/1; MG/1
CAPSULE ORAL
Qty: 30 CAPSULE | Refills: 0 | Status: SHIPPED | OUTPATIENT
Start: 2021-02-21 | End: 2021-03-03 | Stop reason: SDUPTHER

## 2021-03-03 ENCOUNTER — TELEMEDICINE (OUTPATIENT)
Dept: FAMILY MEDICINE CLINIC | Facility: CLINIC | Age: 51
End: 2021-03-03
Payer: COMMERCIAL

## 2021-03-03 VITALS — SYSTOLIC BLOOD PRESSURE: 120 MMHG | DIASTOLIC BLOOD PRESSURE: 80 MMHG

## 2021-03-03 DIAGNOSIS — E78.2 MIXED HYPERLIPIDEMIA: ICD-10-CM

## 2021-03-03 DIAGNOSIS — I10 ESSENTIAL HYPERTENSION: ICD-10-CM

## 2021-03-03 DIAGNOSIS — R74.01 ELEVATED ALT MEASUREMENT: ICD-10-CM

## 2021-03-03 DIAGNOSIS — F41.9 ANXIETY: Primary | ICD-10-CM

## 2021-03-03 PROCEDURE — 3079F DIAST BP 80-89 MM HG: CPT | Performed by: FAMILY MEDICINE

## 2021-03-03 PROCEDURE — 99214 OFFICE O/P EST MOD 30 MIN: CPT | Performed by: FAMILY MEDICINE

## 2021-03-03 PROCEDURE — 3074F SYST BP LT 130 MM HG: CPT | Performed by: FAMILY MEDICINE

## 2021-03-03 PROCEDURE — 3725F SCREEN DEPRESSION PERFORMED: CPT | Performed by: FAMILY MEDICINE

## 2021-03-03 PROCEDURE — 1036F TOBACCO NON-USER: CPT | Performed by: FAMILY MEDICINE

## 2021-03-03 RX ORDER — AMLODIPINE BESYLATE AND BENAZEPRIL HYDROCHLORIDE 10; 20 MG/1; MG/1
1 CAPSULE ORAL DAILY
Qty: 90 CAPSULE | Refills: 1 | Status: SHIPPED | OUTPATIENT
Start: 2021-03-03 | End: 2021-03-03 | Stop reason: SDUPTHER

## 2021-03-03 RX ORDER — AMLODIPINE BESYLATE AND BENAZEPRIL HYDROCHLORIDE 10; 20 MG/1; MG/1
1 CAPSULE ORAL DAILY
Qty: 30 CAPSULE | Refills: 5 | Status: SHIPPED | OUTPATIENT
Start: 2021-03-03 | End: 2021-07-07 | Stop reason: SDUPTHER

## 2021-03-03 RX ORDER — ESCITALOPRAM OXALATE 10 MG/1
10 TABLET ORAL DAILY
Qty: 30 TABLET | Refills: 5 | Status: SHIPPED | OUTPATIENT
Start: 2021-03-03 | End: 2021-07-07

## 2021-03-03 RX ORDER — ATORVASTATIN CALCIUM 20 MG/1
20 TABLET, FILM COATED ORAL DAILY
Qty: 30 TABLET | Refills: 5 | Status: SHIPPED | OUTPATIENT
Start: 2021-03-03 | End: 2021-07-07 | Stop reason: SDUPTHER

## 2021-03-03 NOTE — ASSESSMENT & PLAN NOTE
Symptoms improved mildly on Lexapro 5 milligram   Advised to increase the dose to 10 milligram   Will follow-up sooner if symptoms do not improve  Routine follow-up scheduled after labs in May 2021

## 2021-03-03 NOTE — PROGRESS NOTES
Virtual Regular Visit      Assessment/Plan:    Problem List Items Addressed This Visit        Cardiovascular and Mediastinum    Essential hypertension     Patient's blood pressure is at goal   Continue amlodipine benazepril 10/20 milligram          Relevant Medications    amLODIPine-benazepril (LOTREL) 10-20 MG per capsule       Other    Mixed hyperlipidemia     LDL is at goal   Continue atorvastatin 20 milligram   Metabolic labs/follow-up x 6 months         Relevant Medications    atorvastatin (LIPITOR) 20 mg tablet    Other Relevant Orders    Lipid panel    Elevated ALT measurement     Patient encouraged to continue with low-fat diet/regular exercise  Continue Lipitor         Anxiety - Primary     Symptoms improved mildly on Lexapro 5 milligram   Advised to increase the dose to 10 milligram   Will follow-up sooner if symptoms do not improve  Routine follow-up scheduled after labs in May 2021  Relevant Medications    escitalopram (LEXAPRO) 10 mg tablet               Reason for visit is   Chief Complaint   Patient presents with    Virtual Regular Visit        Encounter provider Ramesh Barbosa MD    Provider located at [address] [place] [phone]      Recent Visits  No visits were found meeting these conditions  Showing recent visits within past 7 days and meeting all other requirements     Today's Visits  Date Type Provider Dept   03/03/21 Telemedicine Ramesh Barbosa MD [facility]   Showing today's visits and meeting all other requirements     Future Appointments  No visits were found meeting these conditions  Showing future appointments within next 150 days and meeting all other requirements        The patient was identified by name and date of birth   Filbert Gosselin was informed that this is a telemedicine visit and that the visit is being conducted through [facility] and patient was informed that this is a secure, HIPAA-compliant platform  He agrees to proceed     My office door was closed  No one else was in the room  He acknowledged consent and understanding of privacy and security of the video platform  The patient has agreed to participate and understands they can discontinue the visit at any time  Patient is aware this is a billable service  Subjective  Trent Bumpers is a 53 y o  male    Hypertension  This is a chronic problem  The current episode started more than 1 year ago  The problem is controlled  Associated symptoms include anxiety  Pertinent negatives include no chest pain, headaches, palpitations or shortness of breath  Risk factors for coronary artery disease include dyslipidemia, male gender and obesity  Treatments tried: Amlodipine benazepril 10/20 milligram  The current treatment provides significant improvement  There are no compliance problems  Hyperlipidemia  This is a chronic problem  The current episode started more than 1 year ago  The problem is controlled  Recent lipid tests were reviewed and are normal  Pertinent negatives include no chest pain, myalgias or shortness of breath  Current antihyperlipidemic treatment includes statins  The current treatment provides significant improvement of lipids  There are no compliance problems  Risk factors for coronary artery disease include dyslipidemia, hypertension and male sex  Anxiety  Presents for follow-up visit  Symptoms include excessive worry and nervous/anxious behavior (Improving)  Patient reports no chest pain, decreased concentration, depressed mood, insomnia, irritability, palpitations, restlessness, shortness of breath or suicidal ideas  Symptoms occur occasionally  The severity of symptoms is causing significant distress  Compliance with medications: Lexapro 5 milligram  Treatment side effects: None          Past Medical History:   Diagnosis Date    Diverticulitis of colon     Hyperlipemia     Hypertension        Past Surgical History:   Procedure Laterality Date    COLECTOMY      [place] COLONOSCOPY FLX DX W/COLLJ SPEC WHEN PFRMD N/A 4/11/2019    Procedure: COLONOSCOPY;  Surgeon: Yashira Castrejon MD;  [facility]; Service: General       Current Outpatient Medications   Medication Sig Dispense Refill    amLODIPine-benazepril (LOTREL) 10-20 MG per capsule Take 1 capsule by mouth daily 30 capsule 5    atorvastatin (LIPITOR) 20 mg tablet Take 1 tablet (20 mg total) by mouth daily 30 tablet 5    escitalopram (LEXAPRO) 10 mg tablet Take 1 tablet (10 mg total) by mouth daily 30 tablet 5     No current facility-administered medications for this visit  No Known Allergies    Review of Systems   Constitutional: Negative  Negative for irritability  Respiratory: Negative  Negative for shortness of breath  Cardiovascular: Negative  Negative for chest pain and palpitations  Gastrointestinal: Negative  Endocrine: Negative  Genitourinary: Negative  Musculoskeletal: Negative  Negative for myalgias  Allergic/Immunologic: Negative  Neurological: Negative  Negative for headaches  Psychiatric/Behavioral: Negative for decreased concentration and suicidal ideas  The patient is nervous/anxious (Improving)  The patient does not have insomnia  Video Exam    Vitals:    03/03/21 1109   BP: 120/80       Physical Exam  Constitutional:       General: He is not in acute distress  Appearance: He is well-developed  Pulmonary:      Effort: Pulmonary effort is normal  No respiratory distress  Neurological:      Mental Status: He is alert and oriented to person, place, and time  Psychiatric:         Behavior: Behavior normal          Thought Content: Thought content normal          Judgment: Judgment normal           I spent 25 minutes with patient today in which greater than 50% of the time was spent in counseling/coordination of care regarding Reviewing labs, management of symptoms     adjusting medication dose       VIRTUAL VISIT DISCLAIMER    Yovany Azul acknowledges that he has consented to an online visit or consultation  He understands that the online visit is based solely on information provided by him, and that, in the absence of a face-to-face physical evaluation by the physician, the diagnosis he receives is both limited and provisional in terms of accuracy and completeness  This is not intended to replace a full medical face-to-face evaluation by the physician  Yovany Azul understands and accepts these terms

## 2021-05-03 ENCOUNTER — TELEPHONE (OUTPATIENT)
Dept: FAMILY MEDICINE CLINIC | Facility: CLINIC | Age: 51
End: 2021-05-03

## 2021-05-04 NOTE — TELEPHONE ENCOUNTER
I ordered CMP, lipid panel  CMP is pretty comprehensive, will check his liver function? Is he asking for something in particular ? If not, then This is all I need for this fup

## 2021-07-07 ENCOUNTER — TELEMEDICINE (OUTPATIENT)
Dept: FAMILY MEDICINE CLINIC | Facility: CLINIC | Age: 51
End: 2021-07-07
Payer: COMMERCIAL

## 2021-07-07 VITALS
BODY MASS INDEX: 33.13 KG/M2 | DIASTOLIC BLOOD PRESSURE: 68 MMHG | SYSTOLIC BLOOD PRESSURE: 124 MMHG | WEIGHT: 250 LBS | HEIGHT: 73 IN

## 2021-07-07 DIAGNOSIS — F41.9 ANXIETY: ICD-10-CM

## 2021-07-07 DIAGNOSIS — R73.01 ELEVATED FASTING BLOOD SUGAR: ICD-10-CM

## 2021-07-07 DIAGNOSIS — I10 ESSENTIAL HYPERTENSION: Primary | ICD-10-CM

## 2021-07-07 DIAGNOSIS — E78.2 MIXED HYPERLIPIDEMIA: ICD-10-CM

## 2021-07-07 PROBLEM — K36 CHRONIC APPENDICITIS: Status: RESOLVED | Noted: 2019-01-04 | Resolved: 2021-07-07

## 2021-07-07 PROCEDURE — 3078F DIAST BP <80 MM HG: CPT | Performed by: FAMILY MEDICINE

## 2021-07-07 PROCEDURE — 99214 OFFICE O/P EST MOD 30 MIN: CPT | Performed by: FAMILY MEDICINE

## 2021-07-07 PROCEDURE — 3008F BODY MASS INDEX DOCD: CPT | Performed by: FAMILY MEDICINE

## 2021-07-07 PROCEDURE — 3725F SCREEN DEPRESSION PERFORMED: CPT | Performed by: FAMILY MEDICINE

## 2021-07-07 PROCEDURE — 1036F TOBACCO NON-USER: CPT | Performed by: FAMILY MEDICINE

## 2021-07-07 PROCEDURE — 3074F SYST BP LT 130 MM HG: CPT | Performed by: FAMILY MEDICINE

## 2021-07-07 RX ORDER — AMLODIPINE BESYLATE AND BENAZEPRIL HYDROCHLORIDE 10; 20 MG/1; MG/1
1 CAPSULE ORAL DAILY
Qty: 30 CAPSULE | Refills: 5 | Status: SHIPPED | OUTPATIENT
Start: 2021-07-07 | End: 2022-01-27 | Stop reason: SDUPTHER

## 2021-07-07 RX ORDER — ATORVASTATIN CALCIUM 20 MG/1
20 TABLET, FILM COATED ORAL DAILY
Qty: 30 TABLET | Refills: 5 | Status: SHIPPED | OUTPATIENT
Start: 2021-07-07 | End: 2021-10-18

## 2021-07-07 NOTE — PROGRESS NOTES
Virtual Regular Visit      Assessment/Plan:    Problem List Items Addressed This Visit        Cardiovascular and Mediastinum    Essential hypertension - Primary     Patient's blood pressure is at goal   Continue amlodipine benazepril 10/20 milligram            Relevant Medications    amLODIPine-benazepril (LOTREL) 10-20 MG per capsule    Other Relevant Orders    Comprehensive metabolic panel    Lipid panel       Other    Mixed hyperlipidemia     LDL is at goal   Continue atorvastatin 20 milligrams         Relevant Medications    atorvastatin (LIPITOR) 20 mg tablet    Other Relevant Orders    Comprehensive metabolic panel    Lipid panel    Anxiety     Not on lexapro  Symptoms have resolved  Patient does not want to take any medications  Elevated fasting blood sugar     Check A1c TO R/O DIABETES  Relevant Orders    Hemoglobin A1C               Reason for visit is   Chief Complaint   Patient presents with    Follow-up    Virtual Regular Visit        Encounter provider Maribel Ham MD    Provider located at [address] [place] [phone]      Recent Visits  No visits were found meeting these conditions  Showing recent visits within past 7 days and meeting all other requirements  Today's Visits  Date Type Provider Dept   07/07/21 Telemedicine Maribel Ham MD [facility]   Showing today's visits and meeting all other requirements  Future Appointments  No visits were found meeting these conditions  Showing future appointments within next 150 days and meeting all other requirements       The patient was identified by name and date of birth  Diane Carmen was informed that this is a telemedicine visit and that the visit is being conducted through [employer] [facility] and patient was informed that this is a secure, HIPAA-compliant platform  He agrees to proceed     My office door was closed  No one else was in the room    He acknowledged consent and understanding of privacy and security of the video platform  The patient has agreed to participate and understands they can discontinue the visit at any time  Patient is aware this is a billable service  Maryam Gutierrez is a 49 y o  male    Hypertension  This is a chronic problem  The current episode started more than 1 year ago  The problem is controlled  Associated symptoms include anxiety  Pertinent negatives include no chest pain, headaches, palpitations or shortness of breath  Risk factors for coronary artery disease include dyslipidemia and male gender  Treatments tried: Amlodipine benazepril 10/20 milligram daily  The current treatment provides significant improvement  There are no compliance problems  Hyperlipidemia  This is a chronic problem  The current episode started more than 1 year ago  The problem is controlled  Recent lipid tests were reviewed and are normal  Pertinent negatives include no chest pain, myalgias or shortness of breath  Current antihyperlipidemic treatment includes statins  The current treatment provides significant improvement of lipids  There are no compliance problems  Anxiety  Presents for follow-up visit  Patient reports no chest pain, decreased concentration, depressed mood, irritability, nervous/anxious behavior, palpitations, shortness of breath or suicidal ideas  Symptoms occur rarely  The severity of symptoms is mild  The quality of sleep is good  Compliance with medications: Stopped Lexapro due to side effects  patient states that his anxiety has resolved now and that he does not wish to be on any medications  Patient understands that if symptoms worsen then he will follow-up      Past Medical History:   Diagnosis Date    Diverticulitis of colon     Hyperlipemia     Hypertension        Past Surgical History:   Procedure Laterality Date    COLECTOMY      [place] COLONOSCOPY FLX DX W/COLLJ SPEC WHEN PFRMD N/A 4/11/2019 Procedure: COLONOSCOPY;  Surgeon: Anastasiya Clancy MD;  [facility]; Service: General       Current Outpatient Medications   Medication Sig Dispense Refill    amLODIPine-benazepril (LOTREL) 10-20 MG per capsule Take 1 capsule by mouth daily 30 capsule 5    atorvastatin (LIPITOR) 20 mg tablet Take 1 tablet (20 mg total) by mouth daily 30 tablet 5     No current facility-administered medications for this visit  No Known Allergies    Review of Systems   Constitutional: Negative  Negative for irritability  Respiratory: Negative  Negative for shortness of breath  Cardiovascular: Negative  Negative for chest pain and palpitations  Gastrointestinal: Negative  Endocrine: Negative  Genitourinary: Negative  Musculoskeletal: Negative  Negative for myalgias  Allergic/Immunologic: Negative  Neurological: Negative  Negative for headaches  Psychiatric/Behavioral: Negative  Negative for decreased concentration and suicidal ideas  The patient is not nervous/anxious  Video Exam    Vitals:    07/07/21 1035   BP: 124/68   Weight: 113 kg (250 lb)   Height: 6' 1" (1 854 m)       Physical Exam  Constitutional:       General: He is not in acute distress  Appearance: He is well-developed  Pulmonary:      Effort: Pulmonary effort is normal  No respiratory distress  Neurological:      Mental Status: He is alert and oriented to person, place, and time  Psychiatric:         Behavior: Behavior normal          Thought Content: Thought content normal          Judgment: Judgment normal         ]  I spent 25 minutes with patient today in which greater than 50% of the time was spent in counseling/coordination of care regarding Reviewing labs, management of symptoms  Suggesting treatment options for anxiety  VIRTUAL VISIT DISCLAIMER    Van Kenney acknowledges that he has consented to an online visit or consultation   He understands that the online visit is based solely on information provided by him, and that, in the absence of a face-to-face physical evaluation by the physician, the diagnosis he receives is both limited and provisional in terms of accuracy and completeness  This is not intended to replace a full medical face-to-face evaluation by the physician  Madi Edmond understands and accepts these terms

## 2021-10-16 DIAGNOSIS — E78.2 MIXED HYPERLIPIDEMIA: ICD-10-CM

## 2021-10-18 RX ORDER — ATORVASTATIN CALCIUM 20 MG/1
TABLET, FILM COATED ORAL
Qty: 30 TABLET | Refills: 5 | Status: SHIPPED | OUTPATIENT
Start: 2021-10-18 | End: 2022-01-27 | Stop reason: SDUPTHER

## 2022-01-20 ENCOUNTER — RA CDI HCC (OUTPATIENT)
Dept: OTHER | Facility: HOSPITAL | Age: 52
End: 2022-01-20

## 2022-01-20 NOTE — PROGRESS NOTES
[facility]  coding opportunities       Chart reviewed, no opportunity found: CHART REVIEWED, NO OPPORTUNITY FOUND                        Patients insurance company: [employer] (Medicare and Commercial for [employer] and [employer])

## 2022-01-27 ENCOUNTER — TELEMEDICINE (OUTPATIENT)
Dept: FAMILY MEDICINE CLINIC | Facility: CLINIC | Age: 52
End: 2022-01-27
Payer: COMMERCIAL

## 2022-01-27 VITALS
BODY MASS INDEX: 32.47 KG/M2 | DIASTOLIC BLOOD PRESSURE: 73 MMHG | WEIGHT: 245 LBS | HEIGHT: 73 IN | SYSTOLIC BLOOD PRESSURE: 123 MMHG

## 2022-01-27 DIAGNOSIS — E78.2 MIXED HYPERLIPIDEMIA: ICD-10-CM

## 2022-01-27 DIAGNOSIS — I10 ESSENTIAL HYPERTENSION: Primary | ICD-10-CM

## 2022-01-27 DIAGNOSIS — R73.01 ELEVATED FASTING BLOOD SUGAR: ICD-10-CM

## 2022-01-27 PROCEDURE — 1036F TOBACCO NON-USER: CPT | Performed by: FAMILY MEDICINE

## 2022-01-27 PROCEDURE — 99214 OFFICE O/P EST MOD 30 MIN: CPT | Performed by: FAMILY MEDICINE

## 2022-01-27 PROCEDURE — 3725F SCREEN DEPRESSION PERFORMED: CPT | Performed by: FAMILY MEDICINE

## 2022-01-27 PROCEDURE — 3008F BODY MASS INDEX DOCD: CPT | Performed by: FAMILY MEDICINE

## 2022-01-27 RX ORDER — ATORVASTATIN CALCIUM 20 MG/1
20 TABLET, FILM COATED ORAL DAILY
Qty: 30 TABLET | Refills: 5 | Status: SHIPPED | OUTPATIENT
Start: 2022-01-27

## 2022-01-27 RX ORDER — AMLODIPINE BESYLATE AND BENAZEPRIL HYDROCHLORIDE 10; 20 MG/1; MG/1
1 CAPSULE ORAL DAILY
Qty: 30 CAPSULE | Refills: 5 | Status: SHIPPED | OUTPATIENT
Start: 2022-01-27

## 2022-01-28 NOTE — PROGRESS NOTES
Virtual Regular Visit    Verification of patient location:    Patient is located in the following state in which I hold an active license [place]      Assessment/Plan:    Problem List Items Addressed This Visit        Cardiovascular and Mediastinum    Essential hypertension - Primary     Patient's blood pressure is at goal   Continue amlodipine benazepril 10/20 milligram            Relevant Medications    amLODIPine-benazepril (LOTREL) 10-20 MG per capsule       Other    Mixed hyperlipidemia     LDL is at goal   Continue atorvastatin 20 milligrams         Relevant Medications    atorvastatin (LIPITOR) 20 mg tablet    Other Relevant Orders    Lipid panel    BMI 32 0-32 9,adult    Elevated fasting blood sugar     Patient has history of pre diabetes  Labs reveal borderline fasting blood sugar, A1c stable  Patient denies any symptoms of polyuria or polydipsia  Encouraged to continue with low calorie diet and regular exercise  Continue monitoring with metabolic labs at 6 month interval              Relevant Orders    Comprehensive metabolic panel    Hemoglobin A1C          BMI Counseling: Body mass index is 32 32 kg/m²  The BMI is above normal  Nutrition recommendations include decreasing portion sizes, encouraging healthy choices of fruits and vegetables and decreasing fast food intake  Exercise recommendations include moderate physical activity 150 minutes/week  No pharmacotherapy was ordered  Rationale for BMI follow-up plan is due to patient being overweight or obese  Depression Screening and Follow-up Plan: Patient was screened for depression during today's encounter  They screened negative with a PHQ-2 score of 0          Reason for visit is   Chief Complaint   Patient presents with    Virtual Regular Visit        Encounter provider Sammy Larkin MD    Provider located at [address] [place] [phone]      Recent Visits  No visits were found meeting these conditions  Showing recent visits within past 7 days and meeting all other requirements  Today's Visits  Date Type Provider Dept   01/27/22 Telemedicine Jayson Cockayne, MD [facility]   Showing today's visits and meeting all other requirements  Future Appointments  No visits were found meeting these conditions  Showing future appointments within next 150 days and meeting all other requirements       The patient was identified by name and date of birth  Westley Maya was informed that this is a telemedicine visit and that the visit is being conducted through [address] [facility] and patient was informed that this is a secure, HIPAA-compliant platform  He agrees to proceed     My office door was closed  No one else was in the room  He acknowledged consent and understanding of privacy and security of the video platform  The patient has agreed to participate and understands they can discontinue the visit at any time  Patient is aware this is a billable service  Subjective  Westley Maya is a 49 y o  male    Hypertension  This is a chronic problem  The current episode started more than 1 year ago  The problem is controlled  Pertinent negatives include no chest pain, headaches, palpitations or shortness of breath  Treatments tried: Amlodipine benazepril 10/20 milligram daily  The current treatment provides significant improvement  There are no compliance problems  Hyperlipidemia  This is a chronic problem  The current episode started more than 1 year ago  The problem is controlled  Recent lipid tests were reviewed and are normal  Pertinent negatives include no chest pain, myalgias or shortness of breath  Current antihyperlipidemic treatment includes statins  The current treatment provides significant improvement of lipids  There are no compliance problems  Patient has history of pre diabetes  Labs reveal borderline fasting blood sugar, A1c stable    Patient denies any symptoms of polyuria or polydipsia  Past Medical History:   Diagnosis Date    Diverticulitis of colon     Hyperlipemia     Hypertension        Past Surgical History:   Procedure Laterality Date    COLECTOMY      [place] COLONOSCOPY FLX DX W/COLLJ SPEC WHEN PFRMD N/A 4/11/2019    Procedure: COLONOSCOPY;  Surgeon: Nicole Winston MD;  [facility]; Service: General       Current Outpatient Medications   Medication Sig Dispense Refill    amLODIPine-benazepril (LOTREL) 10-20 MG per capsule Take 1 capsule by mouth daily 30 capsule 5    atorvastatin (LIPITOR) 20 mg tablet Take 1 tablet (20 mg total) by mouth daily 30 tablet 5     No current facility-administered medications for this visit  No Known Allergies    Review of Systems   Constitutional: Negative  Respiratory: Negative  Negative for shortness of breath  Cardiovascular: Negative  Negative for chest pain and palpitations  Gastrointestinal: Negative  Endocrine: Negative  Genitourinary: Negative  Musculoskeletal: Negative  Negative for myalgias  Allergic/Immunologic: Negative  Neurological: Negative  Negative for headaches  Psychiatric/Behavioral: Negative  Video Exam    Vitals:    01/27/22 1327   BP: 123/73   Weight: 111 kg (245 lb)   Height: 6' 1" (1 854 m)       Physical Exam  Constitutional:       General: He is not in acute distress  Appearance: He is well-developed  Pulmonary:      Effort: Pulmonary effort is normal  No respiratory distress  Neurological:      Mental Status: He is alert and oriented to person, place, and time  Psychiatric:         Behavior: Behavior normal          Thought Content: Thought content normal          Judgment: Judgment normal           I spent 25 minutes with patient today in which greater than 50% of the time was spent in counseling/coordination of care regarding Reviewing labs, management of symptoms  Suggesting treatment options      VIRTUAL VISIT DISCLAIMER      James Walker verbally agrees to participate in [employer]  Pt is aware that [employer] could be limited without vital signs or the ability to perform a full hands-on physical exam  Enrique Roberts understands he or the provider may request at any time to terminate the video visit and request the patient to seek care or treatment in person

## 2022-01-28 NOTE — ASSESSMENT & PLAN NOTE
Patient has history of pre diabetes  Labs reveal borderline fasting blood sugar, A1c stable  Patient denies any symptoms of polyuria or polydipsia  Encouraged to continue with low calorie diet and regular exercise    Continue monitoring with metabolic labs at 6 month interval

## 2022-07-09 LAB — HBA1C MFR BLD HPLC: 5.7 %

## 2022-08-25 ENCOUNTER — OFFICE VISIT (OUTPATIENT)
Dept: FAMILY MEDICINE CLINIC | Facility: CLINIC | Age: 52
End: 2022-08-25
Payer: COMMERCIAL

## 2022-08-25 VITALS
WEIGHT: 259.6 LBS | HEART RATE: 86 BPM | HEIGHT: 73 IN | BODY MASS INDEX: 34.4 KG/M2 | OXYGEN SATURATION: 96 % | RESPIRATION RATE: 16 BRPM | SYSTOLIC BLOOD PRESSURE: 130 MMHG | DIASTOLIC BLOOD PRESSURE: 80 MMHG

## 2022-08-25 DIAGNOSIS — R73.03 PREDIABETES: Primary | ICD-10-CM

## 2022-08-25 DIAGNOSIS — I10 ESSENTIAL HYPERTENSION: ICD-10-CM

## 2022-08-25 DIAGNOSIS — E78.2 MIXED HYPERLIPIDEMIA: ICD-10-CM

## 2022-08-25 PROCEDURE — 99214 OFFICE O/P EST MOD 30 MIN: CPT | Performed by: FAMILY MEDICINE

## 2022-08-25 RX ORDER — ATORVASTATIN CALCIUM 20 MG/1
20 TABLET, FILM COATED ORAL DAILY
Qty: 30 TABLET | Refills: 5 | Status: SHIPPED | OUTPATIENT
Start: 2022-08-25

## 2022-08-25 RX ORDER — AMLODIPINE BESYLATE AND BENAZEPRIL HYDROCHLORIDE 10; 20 MG/1; MG/1
1 CAPSULE ORAL DAILY
Qty: 30 CAPSULE | Refills: 5 | Status: SHIPPED | OUTPATIENT
Start: 2022-08-25

## 2022-08-25 NOTE — PROGRESS NOTES
Subjective:      Patient ID: Tristian Winters is a 47 y o  male  Hypertension  This is a chronic problem  The current episode started more than 1 year ago  The problem is controlled  Pertinent negatives include no chest pain  Risk factors for coronary artery disease include dyslipidemia and male gender  Past treatments include calcium channel blockers and ACE inhibitors  The current treatment provides significant improvement  There are no compliance problems  There is no history of chronic renal disease  Hyperlipidemia  This is a chronic problem  The current episode started more than 1 year ago  The problem is controlled  Recent lipid tests were reviewed and are normal  He has no history of chronic renal disease  Pertinent negatives include no chest pain or myalgias  Current antihyperlipidemic treatment includes statins  The current treatment provides significant improvement of lipids  There are no compliance problems  Patient has history of pre diabetes  A1c stable at 5 7  Past Medical History:   Diagnosis Date    Diverticulitis of colon     Hyperlipemia     Hypertension        Family History   Problem Relation Age of Onset   Lakia Denise COPD Mother     Hyperlipidemia Mother     Hypertension Mother     COPD Father     Coronary artery disease Maternal Grandfather        Past Surgical History:   Procedure Laterality Date    COLECTOMY      [place] COLONOSCOPY FLX DX W/COLLJ SPEC WHEN PFRMD N/A 4/11/2019    Procedure: COLONOSCOPY;  Surgeon: Aicha Floyd MD;  [facility]; Service: General        reports that he has quit smoking  His smoking use included cigarettes  He has a 5 00 pack-year smoking history  He quit smokeless tobacco use about 2 years ago  He reports previous alcohol use  He reports that he does not use drugs        Current Outpatient Medications:     amLODIPine-benazepril (LOTREL) 10-20 MG per capsule, Take 1 capsule by mouth daily, Disp: 30 capsule, Rfl: 5    atorvastatin (LIPITOR) 20 mg tablet, Take 1 tablet (20 mg total) by mouth daily, Disp: 30 tablet, Rfl: 5    The following portions of the patient's history were reviewed and updated as appropriate: allergies, current medications, past family history, past medical history, past social history, past surgical history and problem list     Review of Systems   Constitutional: Negative  Respiratory: Negative  Cardiovascular: Negative  Negative for chest pain  Gastrointestinal: Negative  Musculoskeletal: Negative  Negative for myalgias  Neurological: Negative  Psychiatric/Behavioral: Negative  Objective:    /80 (BP Location: Left arm, Patient Position: Sitting, Cuff Size: Large)   Pulse 86   Resp 16   Ht 6' 1" (1 854 m)   Wt 118 kg (259 lb 9 6 oz)   SpO2 96%   BMI 34 25 kg/m²      Physical Exam  Constitutional:       Appearance: He is well-developed  HENT:      Mouth/Throat:      Pharynx: No oropharyngeal exudate  Cardiovascular:      Rate and Rhythm: Normal rate and regular rhythm  Pulmonary:      Effort: Pulmonary effort is normal       Breath sounds: Normal breath sounds  Abdominal:      General: Bowel sounds are normal       Palpations: Abdomen is soft  Neurological:      Mental Status: He is alert and oriented to person, place, and time  Psychiatric:         Behavior: Behavior normal          Judgment: Judgment normal            No results found for this or any previous visit (from the past 1008 hour(s))  Assessment/Plan:    No problem-specific Assessment & Plan notes found for this encounter             Problem List Items Addressed This Visit        Cardiovascular and Mediastinum    Essential hypertension     Patient's blood pressure is at goal   Continue amlodipine benazepril 10/20 milligram            Relevant Medications    amLODIPine-benazepril (LOTREL) 10-20 MG per capsule    Other Relevant Orders    Comprehensive metabolic panel       Other    Mixed hyperlipidemia     LDL is at goal  Continue atorvastatin 20 milligrams         Relevant Medications    atorvastatin (LIPITOR) 20 mg tablet    Other Relevant Orders    Lipid panel    Prediabetes - Primary     Patient has history of pre diabetes  Labs reveal borderline fasting blood sugar, A1c stable  Patient denies any symptoms of polyuria or polydipsia  Encouraged to continue with low calorie diet and regular exercise    Continue monitoring with metabolic labs at 6 month interval              Relevant Orders    Hemoglobin A1C

## 2023-02-16 ENCOUNTER — RA CDI HCC (OUTPATIENT)
Dept: OTHER | Facility: HOSPITAL | Age: 53
End: 2023-02-16

## 2023-02-16 NOTE — PROGRESS NOTES
[facility]  coding opportunities       Chart reviewed, no opportunity found: CHART REVIEWED, NO OPPORTUNITY FOUND        Patients Insurance        Commercial Insurance: [employer]ocaneg[employer]

## 2023-04-04 LAB — HBA1C MFR BLD HPLC: 6.1 %

## 2023-09-22 LAB — HBA1C MFR BLD HPLC: 5.7 %

## 2023-10-06 DIAGNOSIS — E78.2 MIXED HYPERLIPIDEMIA: ICD-10-CM

## 2023-10-06 DIAGNOSIS — I10 ESSENTIAL HYPERTENSION: ICD-10-CM

## 2023-10-06 RX ORDER — ATORVASTATIN CALCIUM 20 MG/1
20 TABLET, FILM COATED ORAL DAILY
Qty: 90 TABLET | Refills: 1 | Status: SHIPPED | OUTPATIENT
Start: 2023-10-06

## 2023-10-06 RX ORDER — AMLODIPINE BESYLATE AND BENAZEPRIL HYDROCHLORIDE 10; 20 MG/1; MG/1
1 CAPSULE ORAL DAILY
Qty: 90 CAPSULE | Refills: 1 | Status: SHIPPED | OUTPATIENT
Start: 2023-10-06

## 2023-11-02 ENCOUNTER — TELEMEDICINE (OUTPATIENT)
Dept: FAMILY MEDICINE CLINIC | Facility: CLINIC | Age: 53
End: 2023-11-02
Payer: COMMERCIAL

## 2023-11-02 VITALS
HEIGHT: 74 IN | WEIGHT: 256 LBS | DIASTOLIC BLOOD PRESSURE: 73 MMHG | SYSTOLIC BLOOD PRESSURE: 126 MMHG | BODY MASS INDEX: 32.85 KG/M2

## 2023-11-02 DIAGNOSIS — I10 ESSENTIAL HYPERTENSION: Primary | ICD-10-CM

## 2023-11-02 DIAGNOSIS — R73.03 PREDIABETES: ICD-10-CM

## 2023-11-02 DIAGNOSIS — Z12.5 SCREENING FOR PROSTATE CANCER: ICD-10-CM

## 2023-11-02 DIAGNOSIS — E78.2 MIXED HYPERLIPIDEMIA: ICD-10-CM

## 2023-11-02 PROCEDURE — 99213 OFFICE O/P EST LOW 20 MIN: CPT | Performed by: FAMILY MEDICINE

## 2023-11-02 NOTE — PROGRESS NOTES
Virtual Regular Visit    Verification of patient location:    Patient is located at Home in the following state in which I hold an active license [place]      Assessment/Plan:    Problem List Items Addressed This Visit          Cardiovascular and Mediastinum    Essential hypertension - Primary     Patient's blood pressure is at goal.  Continue amlodipine benazepril 10/20 milligram.   it is disgusting            Other    Mixed hyperlipidemia     LDL is at goal.  Continue atorvastatin 20 milligrams         Relevant Orders    Lipid panel    Prediabetes     Glycemic control has improved, A1c is 5.7  Continue with low-carb diet/exercise. Continue monitoring with labs at 6-month. .         Relevant Orders    Comprehensive metabolic panel    Hemoglobin A1C     Other Visit Diagnoses       Screening for prostate cancer        Relevant Orders    PSA, Total Screen            BMI Counseling: Body mass index is 32.87 kg/m². The BMI is above normal. Nutrition recommendations include encouraging healthy choices of fruits and vegetables. Exercise recommendations include moderate physical activity 150 minutes/week. Rationale for BMI follow-up plan is due to patient being overweight or obese. Depression Screening and Follow-up Plan: Patient was screened for depression during today's encounter. They screened negative with a PHQ-2 score of 0. Reason for visit is   Chief Complaint   Patient presents with    Follow-up    Virtual Regular Visit          Encounter provider Joseph Castillo MD    Provider located at [address] [place] [id number]      Recent Visits  No visits were found meeting these conditions.   Showing recent visits within past 7 days and meeting all other requirements  Today's Visits  Date Type Provider Dept   11/02/23 Telemedicine Joseph Castillo MD [facility] [place]   Showing today's visits and meeting all other requirements  Future Appointments  No visits were found meeting these conditions. Showing future appointments within next 150 days and meeting all other requirements       The patient was identified by name and date of birth. Timothy Brooks was informed that this is a telemedicine visit and that the visit is being conducted through the [facility] Now platform. He agrees to proceed. .  My office door was closed. No one else was in the room. He acknowledged consent and understanding of privacy and security of the video platform. The patient has agreed to participate and understands they can discontinue the visit at any time. Patient is aware this is a billable service. Subjective  Timothy Brooks is a 47 y.o. male  . HPI     Patient is here for routine followup. Patient has history of hypertension, dyslipidemia, prediabetes. Reports normal blood pressure. Usually monitors his blood pressure at home. Metabolic labs reveal an improved A1c of 5.7. His LDL is at goal.      Past Medical History:   Diagnosis Date    Diverticulitis of colon     Hyperlipemia     Hypertension        Past Surgical History:   Procedure Laterality Date    COLECTOMY      [place] COLONOSCOPY FLX DX W/COLLJ SPEC WHEN PFRMD N/A 4/11/2019    Procedure: COLONOSCOPY;  Surgeon: Melissa Shah MD;  [facility]; Service: General       Current Outpatient Medications   Medication Sig Dispense Refill    amLODIPine-benazepril (LOTREL) 10-20 MG per capsule TAKE ONE CAPSULE BY MOUTH EVERY DAY 90 capsule 1    atorvastatin (LIPITOR) 20 mg tablet TAKE ONE TABLET BY MOUTH EVERY DAY 90 tablet 1     No current facility-administered medications for this visit. No Known Allergies    Review of Systems   Constitutional: Negative. Respiratory: Negative. Cardiovascular: Negative. Video Exam    Vitals:    11/02/23 1516   BP: 126/73   Weight: 116 kg (256 lb)   Height: 6' 2" (1.88 m)       Physical Exam  Constitutional:       Appearance: Normal appearance.    Pulmonary:      Effort: No respiratory distress.           Visit Time  Total Visit Duration: 15

## 2023-11-02 NOTE — ASSESSMENT & PLAN NOTE
Patient's blood pressure is at goal.  Continue amlodipine benazepril 10/20 milligram.   it is disgusting

## 2023-11-02 NOTE — ASSESSMENT & PLAN NOTE
Glycemic control has improved, A1c is 5.7  Continue with low-carb diet/exercise. Continue monitoring with labs at 6-month. Nasra Messer

## 2024-02-21 PROBLEM — Z12.5 PROSTATE CANCER SCREENING: Status: RESOLVED | Noted: 2019-09-26 | Resolved: 2024-02-21

## 2024-04-02 LAB
ALBUMIN SERPL-MCNC: 4.8 G/DL (ref 3.5–5.7)
ALP SERPL-CCNC: 60 U/L (ref 35–120)
ALT SERPL-CCNC: 42 U/L
ANION GAP SERPL CALCULATED.3IONS-SCNC: 10 MMOL/L (ref 3–11)
AST SERPL-CCNC: 24 U/L
BILIRUB SERPL-MCNC: 0.5 MG/DL (ref 0.2–1)
BUN SERPL-MCNC: 10 MG/DL (ref 7–28)
CALCIUM SERPL-MCNC: 10 MG/DL (ref 8.5–10.1)
CHLORIDE SERPL-SCNC: 99 MMOL/L (ref 100–109)
CHOLEST SERPL-MCNC: 128 MG/DL
CHOLEST/HDLC SERPL: 3.6 {RATIO}
CO2 SERPL-SCNC: 30 MMOL/L (ref 21–31)
CREAT SERPL-MCNC: 0.84 MG/DL (ref 0.53–1.3)
CYTOLOGY CMNT CVX/VAG CYTO-IMP: ABNORMAL
EST. AVERAGE GLUCOSE BLD GHB EST-MCNC: 120 MG/DL
GFR/BSA.PRED SERPLBLD CYS-BASED-ARV: 104 ML/MIN/{1.73_M2}
GLUCOSE SERPL-MCNC: 95 MG/DL (ref 65–99)
HBA1C MFR BLD: 5.8 %
HDLC SERPL-MCNC: 36 MG/DL (ref 23–92)
LDLC SERPL CALC-MCNC: 77 MG/DL
NONHDLC SERPL-MCNC: 92 MG/DL
POTASSIUM SERPL-SCNC: 4.8 MMOL/L (ref 3.5–5.2)
PROT SERPL-MCNC: 6.9 G/DL (ref 6.3–8.3)
PSA SERPL-MCNC: 4.32 NG/ML
SODIUM SERPL-SCNC: 139 MMOL/L (ref 135–145)
TRIGL SERPL-MCNC: 77 MG/DL

## 2024-04-03 ENCOUNTER — TELEPHONE (OUTPATIENT)
Dept: FAMILY MEDICINE CLINIC | Facility: CLINIC | Age: 54
End: 2024-04-03

## 2024-04-03 DIAGNOSIS — R97.20 ELEVATED PSA: Primary | ICD-10-CM

## 2024-04-03 DIAGNOSIS — E78.2 MIXED HYPERLIPIDEMIA: ICD-10-CM

## 2024-04-03 DIAGNOSIS — I10 ESSENTIAL HYPERTENSION: ICD-10-CM

## 2024-04-03 RX ORDER — AMLODIPINE BESYLATE AND BENAZEPRIL HYDROCHLORIDE 10; 20 MG/1; MG/1
1 CAPSULE ORAL DAILY
Qty: 90 CAPSULE | Refills: 1 | Status: SHIPPED | OUTPATIENT
Start: 2024-04-03

## 2024-04-03 RX ORDER — ATORVASTATIN CALCIUM 20 MG/1
20 TABLET, FILM COATED ORAL DAILY
Qty: 90 TABLET | Refills: 1 | Status: SHIPPED | OUTPATIENT
Start: 2024-04-03

## 2024-04-03 NOTE — TELEPHONE ENCOUNTER
"Phone rang a few times then an automated message said 'the person you are trying to reach is unavailable. Please try your call again later\"  "

## 2024-04-03 NOTE — TELEPHONE ENCOUNTER
----- Message from Rachael Duke MD sent at 4/3/2024  5:17 PM EDT -----  Please call the patient regarding his abnormal result.  Noted to have elevated PSA.  I would recommend evaluation by urology.  Placing the order for him to consult urology.  He is also due for follow-up please offer him a follow-up appointment.  Will review the rest of the labs at his upcoming appointment.

## 2024-05-09 ENCOUNTER — RA CDI HCC (OUTPATIENT)
Dept: OTHER | Facility: HOSPITAL | Age: 54
End: 2024-05-09

## 2024-05-09 NOTE — PROGRESS NOTES
HCC coding opportunities       Chart reviewed, no opportunity found: CHART REVIEWED, NO OPPORTUNITY FOUND        Patients Insurance        Commercial Insurance: [employer]

## 2024-05-16 ENCOUNTER — OFFICE VISIT (OUTPATIENT)
Dept: FAMILY MEDICINE CLINIC | Facility: CLINIC | Age: 54
End: 2024-05-16

## 2024-05-16 VITALS
OXYGEN SATURATION: 98 % | HEART RATE: 88 BPM | DIASTOLIC BLOOD PRESSURE: 80 MMHG | BODY MASS INDEX: 33.88 KG/M2 | WEIGHT: 264 LBS | HEIGHT: 74 IN | SYSTOLIC BLOOD PRESSURE: 135 MMHG

## 2024-05-16 DIAGNOSIS — I10 ESSENTIAL HYPERTENSION: ICD-10-CM

## 2024-05-16 DIAGNOSIS — E78.2 MIXED HYPERLIPIDEMIA: Primary | ICD-10-CM

## 2024-05-16 DIAGNOSIS — R73.03 PREDIABETES: ICD-10-CM

## 2024-05-16 RX ORDER — ATORVASTATIN CALCIUM 10 MG/1
10 TABLET, FILM COATED ORAL DAILY
Qty: 90 TABLET | Refills: 1
Start: 2024-05-16

## 2024-05-16 NOTE — ASSESSMENT & PLAN NOTE
Glycemic control has improved, A1c is 5.8  Continue with low-carb diet/exercise.  Continue monitoring with labs at 6-month..

## 2024-05-16 NOTE — ASSESSMENT & PLAN NOTE
LDL is at goal.  Currently he is on atorvastatin 20 milligrams.   Patient would like to reduce the dose of atorvastatin and eventually discontinue the medication.  Worried about side effects of statins that he has read online.  Patient informed about the cardiovascular risk factor associated with uncontrolled LDL.  For him it was 168 when he started the statin.  Patient understands.  Would like to wean himself down to 10 mg.  Encouraged to continue monitoring with metabolic labs at 6-month interval.

## 2024-05-16 NOTE — PROGRESS NOTES
"Subjective:      Patient ID: Enrique Almonte is a 54 y.o. male.    HPI    Patient is here for routine followup.  Patient has history of hypertension, dyslipidemia, prediabetes.  Reports normal blood pressure.  Usually monitors his blood pressure at home.  Metabolic labs reveal an improved A1c of 5.7.  His LDL is at goal.     Past Medical History:   Diagnosis Date    Diverticulitis of colon     Hyperlipemia     Hypertension        Family History   Problem Relation Age of Onset    COPD Mother     Hyperlipidemia Mother     Hypertension Mother     COPD Father     Coronary artery disease Maternal Grandfather        Past Surgical History:   Procedure Laterality Date    COLECTOMY      [place] COLONOSCOPY FLX DX W/COLLJ SPEC WHEN PFRMD N/A 4/11/2019    Procedure: COLONOSCOPY;  Surgeon: Garcia Saldana MD;  [facility];  Service: General        reports that he has quit smoking. His smoking use included cigarettes. He has a 5 pack-year smoking history. He quit smokeless tobacco use about 4 years ago. He reports that he does not currently use alcohol. He reports that he does not use drugs.      Current Outpatient Medications:     amLODIPine-benazepril (LOTREL) 10-20 MG per capsule, TAKE ONE CAPSULE BY MOUTH EVERY DAY, Disp: 90 capsule, Rfl: 1    atorvastatin (LIPITOR) 10 mg tablet, Take 1 tablet (10 mg total) by mouth daily, Disp: 90 tablet, Rfl: 1    The following portions of the patient's history were reviewed and updated as appropriate: allergies, current medications, past family history, past medical history, past social history, past surgical history and problem list.    Review of Systems   Constitutional: Negative.    Respiratory: Negative.     Cardiovascular: Negative.            Objective:    /80   Pulse 88   Ht 6' 2\" (1.88 m)   Wt 120 kg (264 lb)   SpO2 98%   BMI 33.90 kg/m²      Physical Exam  Constitutional:       Appearance: Normal appearance.   Cardiovascular:      Heart sounds: Normal heart sounds. "   Pulmonary:      Breath sounds: Normal breath sounds.           No results found for this or any previous visit (from the past 1008 hour(s)).    Assessment/Plan:    No problem-specific Assessment & Plan notes found for this encounter.           Problem List Items Addressed This Visit          Cardiovascular and Mediastinum    Essential hypertension     Patient's blood pressure is at goal.  Continue amlodipine benazepril 10/20 milligram.               Other    Mixed hyperlipidemia - Primary     LDL is at goal.  Currently he is on atorvastatin 20 milligrams.   Patient would like to reduce the dose of atorvastatin and eventually discontinue the medication.  Worried about side effects of statins that he has read online.  Patient informed about the cardiovascular risk factor associated with uncontrolled LDL.  For him it was 168 when he started the statin.  Patient understands.  Would like to wean himself down to 10 mg.  Encouraged to continue monitoring with metabolic labs at 6-month interval.         Relevant Medications    atorvastatin (LIPITOR) 10 mg tablet    Other Relevant Orders    Lipid panel    Prediabetes     Glycemic control has improved, A1c is 5.8  Continue with low-carb diet/exercise.  Continue monitoring with labs at 6-month..         Relevant Orders    Comprehensive metabolic panel    Hemoglobin A1C

## 2024-07-09 ENCOUNTER — CONSULT (OUTPATIENT)
Dept: UROLOGY | Facility: CLINIC | Age: 54
End: 2024-07-09
Payer: COMMERCIAL

## 2024-07-09 VITALS
SYSTOLIC BLOOD PRESSURE: 162 MMHG | HEART RATE: 85 BPM | DIASTOLIC BLOOD PRESSURE: 88 MMHG | HEIGHT: 74 IN | OXYGEN SATURATION: 99 % | BODY MASS INDEX: 32.78 KG/M2 | WEIGHT: 255.4 LBS

## 2024-07-09 DIAGNOSIS — R97.20 ELEVATED PSA: Primary | ICD-10-CM

## 2024-07-09 LAB
BACTERIA UR QL AUTO: NORMAL /HPF
BILIRUB UR QL STRIP: NEGATIVE
CLARITY UR: CLEAR
COLOR UR: COLORLESS
GLUCOSE UR STRIP-MCNC: NEGATIVE MG/DL
HGB UR QL STRIP.AUTO: NEGATIVE
KETONES UR STRIP-MCNC: NEGATIVE MG/DL
LEUKOCYTE ESTERASE UR QL STRIP: NEGATIVE
NITRITE UR QL STRIP: NEGATIVE
NON-SQ EPI CELLS URNS QL MICRO: NORMAL /HPF
PH UR STRIP.AUTO: 6.5 [PH]
PROT UR STRIP-MCNC: NEGATIVE MG/DL
RBC #/AREA URNS AUTO: NORMAL /HPF
SP GR UR STRIP.AUTO: 1 (ref 1–1.03)
UROBILINOGEN UR STRIP-ACNC: <2 MG/DL
WBC #/AREA URNS AUTO: NORMAL /HPF

## 2024-07-09 PROCEDURE — 99204 OFFICE O/P NEW MOD 45 MIN: CPT | Performed by: UROLOGY

## 2024-07-09 PROCEDURE — 81001 URINALYSIS AUTO W/SCOPE: CPT | Performed by: UROLOGY

## 2024-07-09 NOTE — PROGRESS NOTES
Referring Physician: Rachael Duke MD  A copy of this note was sent to the referring physician.       Diagnoses and all orders for this visit:    Elevated PSA  -     Ambulatory Referral to Urology  -     PSA Total, Diagnostic; Future  -     Urinalysis with microscopic            Assessment and plan:       1.  Elevated PSA  -  Lab Results   Component Value Date    PSA 4.32 (H) 04/02/2024    PSA 2.2 03/19/2019    PSA 2.0 12/07/2016   -Negative rectal examination  - Negative familial history for prostate cancer or  associated malignancies    2.  Prior smoker concerned about his risk for bladder cancer  -Screening urinalysis collected, negative for microscopic hematuria.  No gross hematuria is noted      We discussed the role of PSA as a screening biomarker for prostate cancer, including the benefits and current controversies. We discussed that prostate cancer is the most common non-skin cancer in men in the [place], and the second leading cause of cancer death in men. One in six men will be diagnosed with prostate cancer during his lifetime. Over 30,000 men die each year from prostate cancer; however, early detection may save lives.    A variety of factors can affect PSA levels and should be considered in the interpretation of results. The three most common prostate diseases- prostatitis, benign prostatic hyperplasia (BPH), and prostate cancer-may cause elevated PSA levels in the blood. Men choosing to undergo PSA testing should know that some important factors may influence results.    - Change in PSA levels over time, known as PSA velocity, is used to assess both cancer risk and aggressiveness.   - Blood PSA levels tend to increase with age.   - Larger prostates produce larger amounts of PSA.    I recommended a repeat PSA as we have 1 mildly elevated value from a normal prior baseline and is  digital rectal examination is negative and reassuring.    I will follow-up on the results and notify the patient.   He does not have access to [email or web address] so I will have my office call him with the results and we will plan for additional testing/follow-up/MRI if clinically indicated      Dionte Sellers MD      Chief Complaint     PSA consult      History of Present Illness     Enrique Almonte is a 54 y.o. referred in consultation for elevated PSA    Detailed Urologic History     - please refer to HPI    Review of Systems     Review of Systems   Constitutional: Negative for activity change and fatigue.   HENT: Negative for congestion.    Eyes: Negative for visual disturbance.   Respiratory: Negative for shortness of breath and wheezing.    Cardiovascular: Negative for chest pain and leg swelling.   Gastrointestinal: Negative for abdominal pain.   Endocrine: Negative for polyuria.   Genitourinary: Negative for dysuria, flank pain, hematuria and urgency.   Musculoskeletal: Negative for back pain.   Allergic/Immunologic: Negative for immunocompromised state.   Neurological: Negative for dizziness and numbness.   Psychiatric/Behavioral: Negative for dysphoric mood.   All other systems reviewed and are negative.          Allergies     No Known Allergies    Physical Exam       Physical Exam  Constitutional:       General: He is not in acute distress.     Appearance: He is well-developed.   HENT:      Head: Normocephalic and atraumatic.   Cardiovascular:      Comments: Negative lower extremity edema  Pulmonary:      Effort: Pulmonary effort is normal.      Breath sounds: Normal breath sounds.   Abdominal:      Palpations: Abdomen is soft.   Musculoskeletal:         General: Normal range of motion.      Cervical back: Normal range of motion.   Skin:     General: Skin is warm.   Neurological:      Mental Status: He is alert and oriented to person, place, and time.   Psychiatric:         Behavior: Behavior normal.           Vital Signs  Vitals:    07/09/24 1551   BP: 162/88   BP Location: Left arm   Patient Position: Sitting   Cuff Size:  "Adult   Pulse: 85   SpO2: 99%   Weight: 116 kg (255 lb 6.4 oz)   Height: 6' 2\" (1.88 m)         Current Medications       Current Outpatient Medications:     amLODIPine-benazepril (LOTREL) 10-20 MG per capsule, TAKE ONE CAPSULE BY MOUTH EVERY DAY, Disp: 90 capsule, Rfl: 1    atorvastatin (LIPITOR) 10 mg tablet, Take 1 tablet (10 mg total) by mouth daily, Disp: 90 tablet, Rfl: 1      Active Problems     Patient Active Problem List   Diagnosis    Mixed hyperlipidemia    Essential hypertension    Elevated ALT measurement    Smoker    Tick bite    BMI 32.0-32.9,adult    Elevated fasting blood sugar    Prediabetes         Past Medical History     Past Medical History:   Diagnosis Date    Diverticulitis of colon     Hyperlipemia     Hypertension          Surgical History     Past Surgical History:   Procedure Laterality Date    COLECTOMY      [place] COLONOSCOPY FLX DX W/COLLJ SPEC WHEN PFRMD N/A 4/11/2019    Procedure: COLONOSCOPY;  Surgeon: Garcia Saldana MD;  [facility];  Service: General         Family History     Family History   Problem Relation Age of Onset    COPD Mother     Hyperlipidemia Mother     Hypertension Mother     COPD Father     Coronary artery disease Maternal Grandfather          Social History     Social History     Social History     Tobacco Use   Smoking Status Former    Current packs/day: 0.25    Average packs/day: 0.3 packs/day for 20.0 years (5.0 ttl pk-yrs)    Types: Cigarettes   Smokeless Tobacco Former    Quit date: 3/2/2020         Pertinent Lab Values     Lab Results   Component Value Date    CREATININE 0.84 04/02/2024       Lab Results   Component Value Date    PSA 4.32 (H) 04/02/2024    PSA 2.2 03/19/2019    PSA 2.0 12/07/2016       @RESULTRCNT(1H])@      Pertinent Imaging       No results found.      Portions of the record may have been created with voice recognition software.  Occasional wrong word or \"sound a like\" substitutions may have occurred due to the inherent limitations of " voice recognition software.  In addition some of the content generated from this outpatient encounter includes information designed for patient education and/or communication back to the referring provider.  Read the chart carefully and recognize, using context, where substitutions have occurred.

## 2024-09-30 DIAGNOSIS — I10 ESSENTIAL HYPERTENSION: ICD-10-CM

## 2024-10-01 RX ORDER — AMLODIPINE AND BENAZEPRIL HYDROCHLORIDE 10; 20 MG/1; MG/1
1 CAPSULE ORAL DAILY
Qty: 90 CAPSULE | Refills: 1 | Status: SHIPPED | OUTPATIENT
Start: 2024-10-01

## 2024-11-11 LAB
ALBUMIN SERPL-MCNC: 4.6 G/DL (ref 3.5–5.7)
ALP SERPL-CCNC: 58 U/L (ref 35–120)
ALT SERPL-CCNC: 36 U/L
ANION GAP SERPL CALCULATED.3IONS-SCNC: 8 MMOL/L (ref 3–11)
AST SERPL-CCNC: 24 U/L
BILIRUB SERPL-MCNC: 0.5 MG/DL (ref 0.2–1)
BUN SERPL-MCNC: 9 MG/DL (ref 7–28)
CALCIUM SERPL-MCNC: 9.5 MG/DL (ref 8.5–10.5)
CHLORIDE SERPL-SCNC: 100 MMOL/L (ref 100–109)
CHOLEST SERPL-MCNC: 133 MG/DL
CHOLEST/HDLC SERPL: 3.4 {RATIO}
CO2 SERPL-SCNC: 29 MMOL/L (ref 21–31)
CREAT SERPL-MCNC: 0.79 MG/DL (ref 0.53–1.3)
CYTOLOGY CMNT CVX/VAG CYTO-IMP: NORMAL
EST. AVERAGE GLUCOSE BLD GHB EST-MCNC: 108 MG/DL
GFR/BSA.PRED SERPLBLD CYS-BASED-ARV: 106 ML/MIN/{1.73_M2}
GLUCOSE SERPL-MCNC: 90 MG/DL (ref 65–99)
HBA1C MFR BLD: 5.4 %
HDLC SERPL-MCNC: 39 MG/DL (ref 23–92)
LDLC SERPL CALC-MCNC: 81 MG/DL
NONHDLC SERPL-MCNC: 94 MG/DL
POTASSIUM SERPL-SCNC: 4.4 MMOL/L (ref 3.5–5.2)
PROT SERPL-MCNC: 6.7 G/DL (ref 6.3–8.3)
SODIUM SERPL-SCNC: 137 MMOL/L (ref 135–145)
TRIGL SERPL-MCNC: 64 MG/DL

## 2024-11-21 ENCOUNTER — OFFICE VISIT (OUTPATIENT)
Dept: FAMILY MEDICINE CLINIC | Facility: CLINIC | Age: 54
End: 2024-11-21
Payer: COMMERCIAL

## 2024-11-21 VITALS
WEIGHT: 248 LBS | SYSTOLIC BLOOD PRESSURE: 132 MMHG | HEIGHT: 74 IN | BODY MASS INDEX: 31.83 KG/M2 | HEART RATE: 95 BPM | DIASTOLIC BLOOD PRESSURE: 78 MMHG | OXYGEN SATURATION: 99 %

## 2024-11-21 DIAGNOSIS — I10 ESSENTIAL HYPERTENSION: ICD-10-CM

## 2024-11-21 DIAGNOSIS — Z00.00 PREVENTATIVE HEALTH CARE: ICD-10-CM

## 2024-11-21 DIAGNOSIS — R73.03 PREDIABETES: ICD-10-CM

## 2024-11-21 DIAGNOSIS — Z23 ENCOUNTER FOR IMMUNIZATION: Primary | ICD-10-CM

## 2024-11-21 DIAGNOSIS — E78.2 MIXED HYPERLIPIDEMIA: ICD-10-CM

## 2024-11-21 PROCEDURE — 99396 PREV VISIT EST AGE 40-64: CPT | Performed by: FAMILY MEDICINE

## 2024-11-21 PROCEDURE — 90471 IMMUNIZATION ADMIN: CPT | Performed by: FAMILY MEDICINE

## 2024-11-21 PROCEDURE — 90715 TDAP VACCINE 7 YRS/> IM: CPT | Performed by: FAMILY MEDICINE

## 2024-11-21 PROCEDURE — 99214 OFFICE O/P EST MOD 30 MIN: CPT | Performed by: FAMILY MEDICINE

## 2024-11-21 RX ORDER — AMLODIPINE AND BENAZEPRIL HYDROCHLORIDE 10; 20 MG/1; MG/1
1 CAPSULE ORAL DAILY
Qty: 90 CAPSULE | Refills: 1 | Status: SHIPPED | OUTPATIENT
Start: 2024-11-21

## 2024-11-21 RX ORDER — ROSUVASTATIN CALCIUM 5 MG/1
5 TABLET, COATED ORAL DAILY
Qty: 90 TABLET | Refills: 1 | Status: SHIPPED | OUTPATIENT
Start: 2024-11-21

## 2024-11-21 NOTE — ASSESSMENT & PLAN NOTE
LDL is at goal.  Currently he is on atorvastatin 10 milligrams.   Patient would like to reduce the dose of atorvastatin and eventually discontinue the medication.  Worried about side effects of statins that he has read online.  Patient informed about the cardiovascular risk factor associated with dyslipidemia.  His current LDL is 81 which is very well-controlled.  Patient requesting a 5 mg dose, switched over to Crestor 5 mg.  Encouraged to continue monitoring with metabolic labs at 6-month interval.

## 2024-11-21 NOTE — PROGRESS NOTES
Subjective:      Patient ID: Enrique Almonte is a 54 y.o. male.    HPI  Patient is here for routine followup.  Patient has history of hypertension, dyslipidemia, prediabetes.  Reports normal blood pressure.  Usually monitors his blood pressure at home.  Metabolic labs reveal an improved A1c of 5.4.  His LDL is at goal.   Patient has been trying to eat healthy and exercise regularly.  Currently on atorvastatin 10 mg.  Patient would like to reduce the dose of atorvastatin further.  Also due for annual physical.  Denies any symptoms of chest pain or shortness of breath.  Is due for tetanus vaccine.    Past Medical History:   Diagnosis Date    Diverticulitis of colon     Hyperlipemia     Hypertension        Family History   Problem Relation Age of Onset    COPD Mother     Hyperlipidemia Mother     Hypertension Mother     COPD Father     Coronary artery disease Maternal Grandfather        Past Surgical History:   Procedure Laterality Date    COLECTOMY      [place] COLONOSCOPY FLX DX W/COLLJ SPEC WHEN PFRMD N/A 4/11/2019    Procedure: COLONOSCOPY;  Surgeon: Garcia Saldana MD;  [facility];  Service: General        reports that he has quit smoking. His smoking use included cigarettes. He has a 5 pack-year smoking history. He quit smokeless tobacco use about 4 years ago. He reports current alcohol use. He reports that he does not use drugs.      Current Outpatient Medications:     amLODIPine-benazepril (LOTREL) 10-20 MG per capsule, Take 1 capsule by mouth daily, Disp: 90 capsule, Rfl: 1    rosuvastatin (CRESTOR) 5 mg tablet, Take 1 tablet (5 mg total) by mouth daily, Disp: 90 tablet, Rfl: 1    The following portions of the patient's history were reviewed and updated as appropriate: allergies, current medications, past family history, past medical history, past social history, past surgical history and problem list.    Review of Systems   Respiratory: Negative.     Cardiovascular: Negative.            Objective:    BP  "132/78   Pulse 95   Ht 6' 2\" (1.88 m)   Wt 112 kg (248 lb)   SpO2 99%   BMI 31.84 kg/m²      Physical Exam  Constitutional:       Appearance: Normal appearance.   HENT:      Right Ear: Tympanic membrane normal.      Left Ear: Tympanic membrane normal.      Mouth/Throat:      Pharynx: No posterior oropharyngeal erythema.   Eyes:      Pupils: Pupils are equal, round, and reactive to light.   Cardiovascular:      Heart sounds: Normal heart sounds.   Pulmonary:      Breath sounds: Normal breath sounds.   Abdominal:      Palpations: Abdomen is soft.   Musculoskeletal:      Right lower leg: No edema.      Left lower leg: No edema.   Neurological:      Mental Status: He is oriented to person, place, and time.   Psychiatric:         Mood and Affect: Mood normal.           Recent Results (from the past 6 weeks)   Comprehensive metabolic panel    Collection Time: 11/11/24  1:58 PM   Result Value Ref Range    Glucose, Random 90 65 - 99 mg/dL    BUN 9 7 - 28 mg/dL    Creatinine 0.79 0.53 - 1.30 mg/dL    Sodium 137 135 - 145 mmol/L    Potassium 4.4 3.5 - 5.2 mmol/L    Chloride 100 100 - 109 mmol/L    CO2 29 21 - 31 mmol/L    Calcium 9.5 8.5 - 10.5 mg/dL    Alkaline Phosphatase 58 35 - 120 U/L    Albumin 4.6 3.5 - 5.7 g/dL    TOTAL BILIRUBIN 0.5 0.2 - 1.0 mg/dL    Protein, Total 6.7 6.3 - 8.3 g/dL    AST 24 <41 U/L    ALT 36 <56 U/L    ANION GAP 8 3 - 11    eGFR 106 >59    Comment (Note)    Lipid panel    Collection Time: 11/11/24  1:58 PM   Result Value Ref Range    Cholesterol, Total 133 <200 mg/dL    Triglycerides 64 <150 mg/dL    HDL Cholesterol 39 23 - 92 mg/dL    Non HDL Chol. (LDL+VLDL) 94 <160 mg/dL    LDL Calculated 81 <130 mg/dL    Chol HDLC Ratio 3.4    Hemoglobin A1C    Collection Time: 11/11/24  1:58 PM   Result Value Ref Range    Hemoglobin A1C 5.4 <5.7 %    Estimated Average Glucose 108 mg/dL       Assessment/Plan:    No problem-specific Assessment & Plan notes found for this encounter.           Problem List " Items Addressed This Visit          Cardiovascular and Mediastinum    Essential hypertension    Patient's blood pressure is at goal.  Continue amlodipine benazepril 10/20 milligram.            Relevant Medications    amLODIPine-benazepril (LOTREL) 10-20 MG per capsule       Other    Mixed hyperlipidemia    LDL is at goal.  Currently he is on atorvastatin 10 milligrams.   Patient would like to reduce the dose of atorvastatin and eventually discontinue the medication.  Worried about side effects of statins that he has read online.  Patient informed about the cardiovascular risk factor associated with dyslipidemia.  His current LDL is 81 which is very well-controlled.  Patient requesting a 5 mg dose, switched over to Crestor 5 mg.  Encouraged to continue monitoring with metabolic labs at 6-month interval.         Relevant Medications    rosuvastatin (CRESTOR) 5 mg tablet    Other Relevant Orders    Lipid panel    Preventative health care    Patient is up-to-date on prostate and colorectal cancer screening.  Received Adacel today         Prediabetes    Glycemic control has improved, A1c is 5.4  Continue with low-carb diet/exercise.  Continue monitoring with labs at 6-month..         Relevant Orders    Comprehensive metabolic panel    Hemoglobin A1C     Other Visit Diagnoses         Encounter for immunization    -  Primary    Relevant Orders    TDAP VACCINE GREATER THAN OR EQUAL TO 6YO IM (Completed)

## 2024-11-21 NOTE — ASSESSMENT & PLAN NOTE
Glycemic control has improved, A1c is 5.4  Continue with low-carb diet/exercise.  Continue monitoring with labs at 6-month..

## 2025-07-25 DIAGNOSIS — E78.2 MIXED HYPERLIPIDEMIA: ICD-10-CM

## 2025-07-31 RX ORDER — ROSUVASTATIN CALCIUM 5 MG/1
5 TABLET, COATED ORAL DAILY
Qty: 90 TABLET | Refills: 1 | OUTPATIENT
Start: 2025-07-31